# Patient Record
Sex: MALE | Race: BLACK OR AFRICAN AMERICAN | NOT HISPANIC OR LATINO | Employment: FULL TIME | URBAN - METROPOLITAN AREA
[De-identification: names, ages, dates, MRNs, and addresses within clinical notes are randomized per-mention and may not be internally consistent; named-entity substitution may affect disease eponyms.]

---

## 2023-09-01 ENCOUNTER — HOSPITAL ENCOUNTER (INPATIENT)
Facility: HOSPITAL | Age: 58
LOS: 4 days | Discharge: HOME | DRG: 638 | End: 2023-09-05
Attending: EMERGENCY MEDICINE | Admitting: ANESTHESIOLOGY
Payer: COMMERCIAL

## 2023-09-01 DIAGNOSIS — E11.10 DKA (DIABETIC KETOACIDOSIS) (HCC): Primary | ICD-10-CM

## 2023-09-01 DIAGNOSIS — E55.9 VITAMIN D DEFICIENCY: ICD-10-CM

## 2023-09-01 DIAGNOSIS — E86.0 DEHYDRATION: ICD-10-CM

## 2023-09-01 DIAGNOSIS — N17.9 ACUTE RENAL FAILURE (ARF) (HCC): ICD-10-CM

## 2023-09-01 DIAGNOSIS — E78.5 HYPERLIPIDEMIA: ICD-10-CM

## 2023-09-01 PROBLEM — E87.20 METABOLIC ACIDOSIS: Status: ACTIVE | Noted: 2023-09-01

## 2023-09-01 PROBLEM — D72.829 LEUKOCYTOSIS: Status: ACTIVE | Noted: 2023-09-01

## 2023-09-01 PROBLEM — R79.89 PSEUDOHYPONATREMIA: Status: ACTIVE | Noted: 2023-09-01

## 2023-09-01 LAB
ALBUMIN SERPL BCP-MCNC: 3.7 G/DL (ref 3.5–5)
ALBUMIN SERPL BCP-MCNC: 4.2 G/DL (ref 3.5–5)
ALP SERPL-CCNC: 118 U/L (ref 34–104)
ALT SERPL W P-5'-P-CCNC: 21 U/L (ref 7–52)
ANION GAP SERPL CALCULATED.3IONS-SCNC: 16 MMOL/L
ANION GAP SERPL CALCULATED.3IONS-SCNC: 21 MMOL/L
ANION GAP SERPL CALCULATED.3IONS-SCNC: 29 MMOL/L
AST SERPL W P-5'-P-CCNC: 32 U/L (ref 13–39)
BACTERIA UR QL AUTO: ABNORMAL /HPF
BASE EX.OXY STD BLDV CALC-SCNC: 73.8 % (ref 60–80)
BASE EX.OXY STD BLDV CALC-SCNC: 85.8 % (ref 60–80)
BASE EXCESS BLDV CALC-SCNC: -10 MMOL/L
BASE EXCESS BLDV CALC-SCNC: -3.8 MMOL/L
BASOPHILS # BLD AUTO: 0.02 THOUSANDS/ÂΜL (ref 0–0.1)
BASOPHILS NFR BLD AUTO: 0 % (ref 0–1)
BETA-HYDROXYBUTYRATE: 6.9 MMOL/L
BILIRUB SERPL-MCNC: 0.62 MG/DL (ref 0.2–1)
BILIRUB UR QL STRIP: NEGATIVE
BUN SERPL-MCNC: 68 MG/DL (ref 5–25)
BUN SERPL-MCNC: 71 MG/DL (ref 5–25)
BUN SERPL-MCNC: 79 MG/DL (ref 5–25)
CALCIUM SERPL-MCNC: 7.8 MG/DL (ref 8.4–10.2)
CALCIUM SERPL-MCNC: 8.2 MG/DL (ref 8.4–10.2)
CALCIUM SERPL-MCNC: 8.8 MG/DL (ref 8.4–10.2)
CHLORIDE SERPL-SCNC: 101 MMOL/L (ref 96–108)
CHLORIDE SERPL-SCNC: 105 MMOL/L (ref 96–108)
CHLORIDE SERPL-SCNC: 86 MMOL/L (ref 96–108)
CLARITY UR: CLEAR
CO2 SERPL-SCNC: 13 MMOL/L (ref 21–32)
CO2 SERPL-SCNC: 16 MMOL/L (ref 21–32)
CO2 SERPL-SCNC: 20 MMOL/L (ref 21–32)
COLOR UR: COLORLESS
CREAT SERPL-MCNC: 1.57 MG/DL (ref 0.6–1.3)
CREAT SERPL-MCNC: 1.68 MG/DL (ref 0.6–1.3)
CREAT SERPL-MCNC: 2.06 MG/DL (ref 0.6–1.3)
EOSINOPHIL # BLD AUTO: 0 THOUSAND/ÂΜL (ref 0–0.61)
EOSINOPHIL NFR BLD AUTO: 0 % (ref 0–6)
ERYTHROCYTE [DISTWIDTH] IN BLOOD BY AUTOMATED COUNT: 12 % (ref 11.6–15.1)
GFR SERPL CREATININE-BSD FRML MDRD: 34 ML/MIN/1.73SQ M
GFR SERPL CREATININE-BSD FRML MDRD: 43 ML/MIN/1.73SQ M
GFR SERPL CREATININE-BSD FRML MDRD: 47 ML/MIN/1.73SQ M
GLUCOSE SERPL-MCNC: 185 MG/DL (ref 65–140)
GLUCOSE SERPL-MCNC: 249 MG/DL (ref 65–140)
GLUCOSE SERPL-MCNC: 296 MG/DL (ref 65–140)
GLUCOSE SERPL-MCNC: 367 MG/DL (ref 65–140)
GLUCOSE SERPL-MCNC: 387 MG/DL (ref 65–140)
GLUCOSE SERPL-MCNC: 398 MG/DL (ref 65–140)
GLUCOSE SERPL-MCNC: 491 MG/DL (ref 65–140)
GLUCOSE SERPL-MCNC: 807 MG/DL (ref 65–140)
GLUCOSE SERPL-MCNC: >500 MG/DL (ref 65–140)
GLUCOSE UR STRIP-MCNC: ABNORMAL MG/DL
HCO3 BLDV-SCNC: 16.1 MMOL/L (ref 24–30)
HCO3 BLDV-SCNC: 20.8 MMOL/L (ref 24–30)
HCT VFR BLD AUTO: 51 % (ref 36.5–49.3)
HGB BLD-MCNC: 16 G/DL (ref 12–17)
HGB UR QL STRIP.AUTO: NEGATIVE
IMM GRANULOCYTES # BLD AUTO: 0.06 THOUSAND/UL (ref 0–0.2)
IMM GRANULOCYTES NFR BLD AUTO: 1 % (ref 0–2)
KETONES UR STRIP-MCNC: ABNORMAL MG/DL
LEUKOCYTE ESTERASE UR QL STRIP: ABNORMAL
LIPASE SERPL-CCNC: 94 U/L (ref 11–82)
LYMPHOCYTES # BLD AUTO: 2.84 THOUSANDS/ÂΜL (ref 0.6–4.47)
LYMPHOCYTES NFR BLD AUTO: 24 % (ref 14–44)
MAGNESIUM SERPL-MCNC: 3.3 MG/DL (ref 1.9–2.7)
MCH RBC QN AUTO: 27.2 PG (ref 26.8–34.3)
MCHC RBC AUTO-ENTMCNC: 31.4 G/DL (ref 31.4–37.4)
MCV RBC AUTO: 87 FL (ref 82–98)
MONOCYTES # BLD AUTO: 0.71 THOUSAND/ÂΜL (ref 0.17–1.22)
MONOCYTES NFR BLD AUTO: 6 % (ref 4–12)
MUCOUS THREADS UR QL AUTO: ABNORMAL
NEUTROPHILS # BLD AUTO: 8.01 THOUSANDS/ÂΜL (ref 1.85–7.62)
NEUTS SEG NFR BLD AUTO: 69 % (ref 43–75)
NITRITE UR QL STRIP: NEGATIVE
NON-SQ EPI CELLS URNS QL MICRO: ABNORMAL /HPF
NRBC BLD AUTO-RTO: 0 /100 WBCS
O2 CT BLDV-SCNC: 16.6 ML/DL
O2 CT BLDV-SCNC: 19.3 ML/DL
PCO2 BLDV: 36.6 MM HG (ref 42–50)
PCO2 BLDV: 36.9 MM HG (ref 42–50)
PH BLDV: 7.26 [PH] (ref 7.3–7.4)
PH BLDV: 7.37 [PH] (ref 7.3–7.4)
PH UR STRIP.AUTO: 5 [PH]
PHOSPHATE SERPL-MCNC: 2.7 MG/DL (ref 2.7–4.5)
PLATELET # BLD AUTO: 318 THOUSANDS/UL (ref 149–390)
PMV BLD AUTO: 12.3 FL (ref 8.9–12.7)
PO2 BLDV: 41.6 MM HG (ref 35–45)
PO2 BLDV: 61.5 MM HG (ref 35–45)
POTASSIUM SERPL-SCNC: 3.7 MMOL/L (ref 3.5–5.3)
POTASSIUM SERPL-SCNC: 3.8 MMOL/L (ref 3.5–5.3)
POTASSIUM SERPL-SCNC: 4.5 MMOL/L (ref 3.5–5.3)
PROCALCITONIN SERPL-MCNC: 0.21 NG/ML
PROT SERPL-MCNC: 8.3 G/DL (ref 6.4–8.4)
PROT UR STRIP-MCNC: NEGATIVE MG/DL
RBC # BLD AUTO: 5.89 MILLION/UL (ref 3.88–5.62)
RBC #/AREA URNS AUTO: ABNORMAL /HPF
SODIUM SERPL-SCNC: 128 MMOL/L (ref 135–147)
SODIUM SERPL-SCNC: 138 MMOL/L (ref 135–147)
SODIUM SERPL-SCNC: 141 MMOL/L (ref 135–147)
SP GR UR STRIP.AUTO: 1.02 (ref 1–1.03)
UROBILINOGEN UR STRIP-ACNC: <2 MG/DL
WBC # BLD AUTO: 11.64 THOUSAND/UL (ref 4.31–10.16)
WBC #/AREA URNS AUTO: ABNORMAL /HPF

## 2023-09-01 PROCEDURE — 80053 COMPREHEN METABOLIC PANEL: CPT | Performed by: EMERGENCY MEDICINE

## 2023-09-01 PROCEDURE — 96361 HYDRATE IV INFUSION ADD-ON: CPT

## 2023-09-01 PROCEDURE — 84145 PROCALCITONIN (PCT): CPT | Performed by: NURSE PRACTITIONER

## 2023-09-01 PROCEDURE — 85025 COMPLETE CBC W/AUTO DIFF WBC: CPT | Performed by: EMERGENCY MEDICINE

## 2023-09-01 PROCEDURE — 84681 ASSAY OF C-PEPTIDE: CPT | Performed by: PHYSICIAN ASSISTANT

## 2023-09-01 PROCEDURE — 82805 BLOOD GASES W/O2 SATURATION: CPT | Performed by: EMERGENCY MEDICINE

## 2023-09-01 PROCEDURE — 99284 EMERGENCY DEPT VISIT MOD MDM: CPT

## 2023-09-01 PROCEDURE — 80048 BASIC METABOLIC PNL TOTAL CA: CPT | Performed by: ANESTHESIOLOGY

## 2023-09-01 PROCEDURE — 86337 INSULIN ANTIBODIES: CPT | Performed by: PHYSICIAN ASSISTANT

## 2023-09-01 PROCEDURE — 83036 HEMOGLOBIN GLYCOSYLATED A1C: CPT | Performed by: NURSE PRACTITIONER

## 2023-09-01 PROCEDURE — 83519 RIA NONANTIBODY: CPT | Performed by: PHYSICIAN ASSISTANT

## 2023-09-01 PROCEDURE — 82948 REAGENT STRIP/BLOOD GLUCOSE: CPT

## 2023-09-01 PROCEDURE — 99291 CRITICAL CARE FIRST HOUR: CPT | Performed by: NURSE PRACTITIONER

## 2023-09-01 PROCEDURE — 86341 ISLET CELL ANTIBODY: CPT | Performed by: PHYSICIAN ASSISTANT

## 2023-09-01 PROCEDURE — 83690 ASSAY OF LIPASE: CPT | Performed by: EMERGENCY MEDICINE

## 2023-09-01 PROCEDURE — 99291 CRITICAL CARE FIRST HOUR: CPT | Performed by: EMERGENCY MEDICINE

## 2023-09-01 PROCEDURE — 36415 COLL VENOUS BLD VENIPUNCTURE: CPT

## 2023-09-01 PROCEDURE — 82805 BLOOD GASES W/O2 SATURATION: CPT | Performed by: NURSE PRACTITIONER

## 2023-09-01 PROCEDURE — 82010 KETONE BODYS QUAN: CPT | Performed by: EMERGENCY MEDICINE

## 2023-09-01 PROCEDURE — 81001 URINALYSIS AUTO W/SCOPE: CPT | Performed by: EMERGENCY MEDICINE

## 2023-09-01 PROCEDURE — 80069 RENAL FUNCTION PANEL: CPT | Performed by: PHYSICIAN ASSISTANT

## 2023-09-01 PROCEDURE — 83735 ASSAY OF MAGNESIUM: CPT | Performed by: PHYSICIAN ASSISTANT

## 2023-09-01 PROCEDURE — 96365 THER/PROPH/DIAG IV INF INIT: CPT

## 2023-09-01 PROCEDURE — 96376 TX/PRO/DX INJ SAME DRUG ADON: CPT

## 2023-09-01 RX ORDER — SODIUM CHLORIDE, SODIUM LACTATE, POTASSIUM CHLORIDE, CALCIUM CHLORIDE 600; 310; 30; 20 MG/100ML; MG/100ML; MG/100ML; MG/100ML
500 INJECTION, SOLUTION INTRAVENOUS CONTINUOUS
Status: DISPENSED | OUTPATIENT
Start: 2023-09-01 | End: 2023-09-01

## 2023-09-01 RX ORDER — SODIUM CHLORIDE, SODIUM LACTATE, POTASSIUM CHLORIDE, CALCIUM CHLORIDE 600; 310; 30; 20 MG/100ML; MG/100ML; MG/100ML; MG/100ML
125 INJECTION, SOLUTION INTRAVENOUS CONTINUOUS
Status: DISCONTINUED | OUTPATIENT
Start: 2023-09-01 | End: 2023-09-02

## 2023-09-01 RX ORDER — ONDANSETRON 2 MG/ML
4 INJECTION INTRAMUSCULAR; INTRAVENOUS EVERY 6 HOURS PRN
Status: DISCONTINUED | OUTPATIENT
Start: 2023-09-01 | End: 2023-09-05 | Stop reason: HOSPADM

## 2023-09-01 RX ORDER — SODIUM CHLORIDE 9 MG/ML
3 INJECTION INTRAVENOUS
Status: DISCONTINUED | OUTPATIENT
Start: 2023-09-01 | End: 2023-09-05 | Stop reason: HOSPADM

## 2023-09-01 RX ORDER — ENOXAPARIN SODIUM 100 MG/ML
40 INJECTION SUBCUTANEOUS DAILY
Status: DISCONTINUED | OUTPATIENT
Start: 2023-09-02 | End: 2023-09-05 | Stop reason: HOSPADM

## 2023-09-01 RX ORDER — ACETAMINOPHEN 325 MG/1
650 TABLET ORAL EVERY 6 HOURS PRN
Status: DISCONTINUED | OUTPATIENT
Start: 2023-09-01 | End: 2023-09-05 | Stop reason: HOSPADM

## 2023-09-01 RX ORDER — CHLORHEXIDINE GLUCONATE ORAL RINSE 1.2 MG/ML
15 SOLUTION DENTAL EVERY 12 HOURS SCHEDULED
Status: DISCONTINUED | OUTPATIENT
Start: 2023-09-01 | End: 2023-09-05 | Stop reason: HOSPADM

## 2023-09-01 RX ORDER — POTASSIUM CHLORIDE 20 MEQ/1
20 TABLET, EXTENDED RELEASE ORAL ONCE
Status: COMPLETED | OUTPATIENT
Start: 2023-09-01 | End: 2023-09-01

## 2023-09-01 RX ORDER — DEXTROSE, SODIUM CHLORIDE, SODIUM LACTATE, POTASSIUM CHLORIDE, AND CALCIUM CHLORIDE 5; .6; .31; .03; .02 G/100ML; G/100ML; G/100ML; G/100ML; G/100ML
125 INJECTION, SOLUTION INTRAVENOUS CONTINUOUS
Status: DISCONTINUED | OUTPATIENT
Start: 2023-09-01 | End: 2023-09-02

## 2023-09-01 RX ORDER — SODIUM CHLORIDE, SODIUM LACTATE, POTASSIUM CHLORIDE, CALCIUM CHLORIDE 600; 310; 30; 20 MG/100ML; MG/100ML; MG/100ML; MG/100ML
125 INJECTION, SOLUTION INTRAVENOUS CONTINUOUS
Status: DISCONTINUED | OUTPATIENT
Start: 2023-09-01 | End: 2023-09-01

## 2023-09-01 RX ADMIN — SODIUM CHLORIDE, SODIUM LACTATE, POTASSIUM CHLORIDE, AND CALCIUM CHLORIDE 500 ML/HR: .6; .31; .03; .02 INJECTION, SOLUTION INTRAVENOUS at 21:34

## 2023-09-01 RX ADMIN — SODIUM CHLORIDE, SODIUM LACTATE, POTASSIUM CHLORIDE, AND CALCIUM CHLORIDE 250 ML/HR: .6; .31; .03; .02 INJECTION, SOLUTION INTRAVENOUS at 23:33

## 2023-09-01 RX ADMIN — DEXTROSE, SODIUM CHLORIDE, SODIUM LACTATE, POTASSIUM CHLORIDE, AND CALCIUM CHLORIDE 250 ML/HR: 5; .6; .31; .03; .02 INJECTION, SOLUTION INTRAVENOUS at 22:02

## 2023-09-01 RX ADMIN — SODIUM CHLORIDE, SODIUM LACTATE, POTASSIUM CHLORIDE, AND CALCIUM CHLORIDE 500 ML/HR: .6; .31; .03; .02 INJECTION, SOLUTION INTRAVENOUS at 19:40

## 2023-09-01 RX ADMIN — SODIUM CHLORIDE 7.3 UNITS/HR: 9 INJECTION, SOLUTION INTRAVENOUS at 17:02

## 2023-09-01 RX ADMIN — INSULIN HUMAN 10 UNITS: 100 INJECTION, SOLUTION PARENTERAL at 16:51

## 2023-09-01 RX ADMIN — SODIUM CHLORIDE 2000 ML: 0.9 INJECTION, SOLUTION INTRAVENOUS at 16:55

## 2023-09-01 RX ADMIN — SODIUM CHLORIDE 1000 ML: 0.9 INJECTION, SOLUTION INTRAVENOUS at 15:00

## 2023-09-01 RX ADMIN — POTASSIUM CHLORIDE 20 MEQ: 1500 TABLET, EXTENDED RELEASE ORAL at 21:57

## 2023-09-01 NOTE — H&P
[address]  H&P  Name: Nona Gates 60 y.o. male I MRN: [id number]  Unit/Bed#: ED 24 I Date of Admission: 9/1/2023   Date of Service: 9/1/2023 I Hospital Day: 0      Assessment/Plan   Metabolic acidosis  Assessment & Plan  · In the setting of DKA  · Will likely correct quickly with IV hydration  · Trend labs    JUAN (acute kidney injury) ([address])  Assessment & Plan  · Baseline creatinine unknown  · Creatinine on admission 2.06  · May be pre-renal in the setting of DKA  · Will continue with IV hydration and monitor  · Avoid nephrotoxic agents  · Strict I/Os  · Trend renal indices    Pseudohyponatremia  Assessment & Plan  · In the setting of hyperglycemia  · Corrected sodium 139    Leukocytosis  Assessment & Plan  · WBC slightly elevated  · Likely reactive in the setting of DKA  · Follow WBC and fever curve  · Will check procal  · Observe off antibiotics    * DKA (diabetic ketoacidosis) ([address])  Assessment & Plan  No results found for: "HGBA1C"    Recent Labs     09/01/23  1433 09/01/23  1541 09/01/23  1813   POCGLU >500* >500* >500*     · Pt without previously known history of DM, reported he was told by his physician his glucose was high on lab work  · Patient also reports nausea and poor appetite  · In ED was found to be in DKA with glucose of 807, elevated hydroxybuterate   · Given insulin bolus and DKA drip started  · Will continue with IV hydration  · Keep NPO for now  · Will consult endocrine for recommendations once DKA resolved             History of Present Illness     HPI: Nona Gates is a 60 y.o. who presents with nausea, poor appetite, and abnormal lab work. The patient reports his physician told him to come to the ED because his glucose was high. He does not have prior history of diabetes or DKA. He was noted to have a glucose of 807, and elevated Beta hydroxybutyrate and acidosis on lab work in the ED. He is being admitted to [address] [place] [address] for management of DKA.     History obtained from chart review and the patient. Review of Systems   Gastrointestinal: Positive for nausea. Historical Information   No past medical history on file. No past surgical history on file. No current outpatient medications Allergies   Allergen Reactions   • Penicillins Edema      Social History     Tobacco Use   • Smoking status: Never   • Smokeless tobacco: Never   Vaping Use   • Vaping Use: Never used   Substance Use Topics   • Alcohol use: Not Currently    History reviewed. No pertinent family history. Objective                            Vitals I/O      Most Recent Min/Max in 24hrs   Temp 97.6 °F (36.4 °C) Temp  Min: 97.6 °F (36.4 °C)  Max: 97.6 °F (36.4 °C)   Pulse 65 Pulse  Min: 65  Max: 76   Resp 15 Resp  Min: 15  Max: 18   /78 BP  Min: 143/82  Max: 154/93   O2 Sat 99 % SpO2  Min: 99 %  Max: 100 %      Intake/Output Summary (Last 24 hours) at 9/1/2023 1912  Last data filed at 9/1/2023 1831  Gross per 24 hour   Intake 1000 ml   Output 1750 ml   Net -750 ml         Diet NPO     Invasive Monitoring Physical exam    Physical Exam  Eyes:      Extraocular Movements: Extraocular movements intact. Pupils: Pupils are equal, round, and reactive to light. Skin:     General: Skin is dry. HENT:      Head: Normocephalic and atraumatic. Mouth/Throat:      Mouth: Mucous membranes are dry. Neck:     Vascular: No JVD. Cardiovascular:      Rate and Rhythm: Normal rate and regular rhythm. Pulses: Normal pulses. Heart sounds: Normal heart sounds. Abdominal:      General: Bowel sounds are normal.      Palpations: Abdomen is soft. Constitutional:       General: He is not in acute distress. Appearance: He is well-developed. Pulmonary:      Effort: Pulmonary effort is normal.      Breath sounds: Normal breath sounds. Neurological:      General: No focal deficit present. Mental Status: He is alert and oriented to person, place and time.             Diagnostic Studies EKG: NSR       Medications:  Scheduled PRN   chlorhexidine, 15 mL, Q12H [address]  [START ON 9/2/2023] enoxaparin, 40 mg, Daily      sodium chloride (PF), 3 mL, Q1H PRN       Continuous    dextrose 5% lactated ringer's, 250 mL/hr  insulin regular (HumuLIN R,NovoLIN R) 1 Units/mL in sodium chloride 0.9 % 100 mL infusion, 0.1-30 Units/hr, Last Rate: 7.3 Units/hr (09/01/23 1840)  lactated ringers, 500 mL/hr   Followed by  lactated ringers, 250 mL/hr  lactated ringers, 125 mL/hr         Labs:    CBC    Recent Labs     09/01/23  1444   WBC 11.64*   HGB 16.0   HCT 51.0*        BMP    Recent Labs     09/01/23  1444   SODIUM 128*   K 4.5   CL 86*   CO2 13*   AGAP 29   BUN 79*   CREATININE 2.06*   CALCIUM 8.8       Coags    No recent results     Additional Electrolytes  No recent results       Blood Gas    No recent results  Recent Labs     09/01/23  1547   PHVEN 7.262*   TAE0KMP 36.6*   PO2VEN 61.5*   [id number] 16.1*   BEVEN -10.0    LFTs  Recent Labs     09/01/23  1444   ALT 21   AST 32   ALKPHOS 118*   ALB 4.2   TBILI 0.62       Infectious  No recent results  Glucose  Recent Labs     09/01/23  1444   GLUC 807*             Critical Care Time Delivered: Upon my evaluation, this patient had a high probability of imminent or life-threatening deterioration due to DKA, which required my direct attention, intervention, and personal management. I have personally provided 33 minutes of critical care time, exclusive of procedures, teaching, family meetings, and any prior time recorded by providers other than myself.    Anticipated Length of Stay is > 2 midnights  BINA Michaels

## 2023-09-01 NOTE — ED PROVIDER NOTES
Pt Name: Richa Venegas  MRN: [id number]  [address] 1965  Age/Sex: 60 y.o. male  Date of evaluation: 9/1/2023  PCP: No primary care provider on file. CHIEF COMPLAINT    Chief Complaint   Patient presents with   • Abnormal Lab     Pt reports that he went to the doc and they told him his BS is high , pt reports that he has nausea and not a good appetite          HPI    60 y.o. male presenting with elevated blood sugar and fatigue as well as polyuria and polydipsia. Patient states that he has been drinking and urinating a lot over the past few days to weeks, states that he went to a doctor a few days ago and was told his blood sugar was high, states that since then has been feeling worse. He also complains of nausea, fatigue, as well as poor appetite but denies fever, trauma, chest pain, shortness breath, abdominal pain, numbness, weakness, other symptoms. Patient denies previous history of diabetes. HPI      Past Medical and Surgical History    History reviewed. No pertinent past medical history. History reviewed. No pertinent surgical history. Family History   Problem Relation Age of Onset   • Diabetes Father    • Diabetes Maternal Uncle        Social History     Tobacco Use   • Smoking status: Never   • Smokeless tobacco: Never   Vaping Use   • Vaping Use: Never used   Substance Use Topics   • Alcohol use: Not Currently   • Drug use: Never           Allergies    Allergies   Allergen Reactions   • Penicillins Edema       Home Medications    Prior to Admission medications    Not on File           Review of Systems    Review of Systems   Constitutional: Positive for fatigue. Negative for appetite change, chills and diaphoresis. HENT: Negative for drooling, facial swelling, trouble swallowing and voice change. Respiratory: Negative for apnea, shortness of breath and wheezing. Cardiovascular: Negative for chest pain and leg swelling. Gastrointestinal: Positive for nausea.  Negative for abdominal distention, abdominal pain, diarrhea and vomiting. Endocrine: Positive for polydipsia and polyuria. Genitourinary: Negative for dysuria and urgency. Musculoskeletal: Negative for arthralgias, back pain, gait problem and neck pain. Skin: Negative for color change, rash and wound. Neurological: Negative for seizures, speech difficulty, weakness and headaches. Psychiatric/Behavioral: Negative for agitation, behavioral problems and dysphoric mood. The patient is not nervous/anxious. All other systems reviewed and negative. Physical Exam      ED Triage Vitals   Temperature Pulse Respirations Blood Pressure SpO2   09/01/23 1417 09/01/23 1417 09/01/23 1417 09/01/23 1417 09/01/23 1417   97.6 °F (36.4 °C) 75 18 154/93 100 %      Temp src Heart Rate Source Patient Position - Orthostatic VS BP Location FiO2 (%)   -- 09/01/23 1417 09/01/23 1711 09/01/23 1711 --    Monitor Lying Left arm       Pain Score       --                      Physical Exam  Vitals and nursing note reviewed. Constitutional:       General: He is in acute distress. Appearance: He is well-developed. He is ill-appearing. He is not toxic-appearing or diaphoretic. HENT:      Head: Normocephalic and atraumatic. Right Ear: External ear normal.      Left Ear: External ear normal.      Nose: Nose normal. No congestion or rhinorrhea. Mouth/Throat:      Mouth: Mucous membranes are dry. Pharynx: Oropharynx is clear. Eyes:      Conjunctiva/sclera: Conjunctivae normal.      Pupils: Pupils are equal, round, and reactive to light. Neck:      Trachea: No tracheal deviation. Cardiovascular:      Rate and Rhythm: Normal rate and regular rhythm. Pulses: Normal pulses. Heart sounds: Normal heart sounds. No murmur heard. Pulmonary:      Effort: Pulmonary effort is normal. No respiratory distress. Breath sounds: Normal breath sounds. No stridor. No wheezing or rales.    Abdominal:      General: There is no distension. Palpations: Abdomen is soft. Tenderness: There is no abdominal tenderness. There is no guarding or rebound. Musculoskeletal:         General: No deformity. Normal range of motion. Cervical back: Normal range of motion and neck supple. No tenderness. Right lower leg: No edema. Left lower leg: No edema. Skin:     General: Skin is warm and dry. Capillary Refill: Capillary refill takes less than 2 seconds. Findings: No rash. Neurological:      Mental Status: He is alert and oriented to person, place, and time. Cranial Nerves: No cranial nerve deficit. Motor: No weakness. Coordination: Coordination normal.      Gait: Gait normal.   Psychiatric:         Behavior: Behavior normal.         Thought Content:  Thought content normal.         Judgment: Judgment normal.              Diagnostic Results      Labs:    Results Reviewed     Procedure Component Value Units Date/Time    Procalcitonin [[id number]]  (Normal) Collected: 09/01/23 2011    Lab Status: Final result Specimen: Blood from Hand, Left Updated: 09/01/23 2052     Procalcitonin 0.21 ng/ml     Renal function panel [[id number]]  (Abnormal) Collected: 09/01/23 2011    Lab Status: Final result Specimen: Blood from Hand, Left Updated: 09/01/23 2046     Albumin 3.7 g/dL      Calcium 8.2 mg/dL      Phosphorus 2.7 mg/dL      Glucose 387 mg/dL      BUN 68 mg/dL      Creatinine 1.57 mg/dL      Sodium 141 mmol/L      Potassium 3.7 mmol/L      Chloride 105 mmol/L      CO2 20 mmol/L      ANION GAP 16 mmol/L      eGFR 47 ml/min/1.73sq m     Narrative:      [place] guidelines for Chronic Kidney Disease (CKD):   •  Stage 1 with normal or high GFR (GFR > 90 mL/min/1.73 square meters)  •  Stage 2 Mild CKD (GFR = 60-89 mL/min/1.73 square meters)  •  Stage 3A Moderate CKD (GFR = 45-59 mL/min/1.73 square meters)  •  Stage 3B Moderate CKD (GFR = 30-44 mL/min/1.73 square meters)  • Stage 4 Severe CKD (GFR = 15-29 mL/min/1.73 square meters)  •  Stage 5 End Stage CKD (GFR <15 mL/min/1.73 square meters)  Note: GFR calculation is accurate only with a steady state creatinine    Magnesium [[id number]]  (Abnormal) Collected: 09/01/23 2011    Lab Status: Final result Specimen: Blood from Hand, Left Updated: 09/01/23 2045     Magnesium 3.3 mg/dL     Blood gas, venous [[id number]]  (Abnormal) Collected: 09/01/23 2011    Lab Status: Final result Specimen: Blood from Hand, Left Updated: 09/01/23 2029     pH, Delgado 7.369     pCO2, Delgado 36.9 mm Hg      pO2, Delgado 41.6 mm Hg      HCO3, Delgado 20.8 mmol/L      Base Excess, Delgado -3.8 mmol/L      O2 Content, Delgado 16.6 ml/dL      O2 HGB, VENOUS 73.8 %     C-peptide [[id number]] Collected: 09/01/23 2011    Lab Status: In process Specimen: Blood from Hand, Left Updated: 09/01/23 2019    Hemoglobin A1C [[id number]] Collected: 09/01/23 2011    Lab Status: In process Specimen: Blood from Hand, Left Updated: 09/01/23 2019    Insulin antibody [[id number]] Collected: 09/01/23 2011    Lab Status: In process Specimen: Blood from Hand, Left Updated: 09/01/23 2019    Anti-islet cell antibody [[id number]] Collected: 09/01/23 2011    Lab Status: In process Specimen: Blood from Hand, Left Updated: 09/01/23 2018    Glutamic acid decarboxylase [[id number]] Collected: 09/01/23 2011    Lab Status:  In process Specimen: Blood from Hand, Left Updated: 09/01/23 2018    Renal function panel [[id number]]     Lab Status: No result Specimen: Blood     Magnesium [[id number]]     Lab Status: No result Specimen: Blood     Blood gas, venous [[id number]]     Lab Status: No result Specimen: Blood     Basic metabolic panel [[id number]]  (Abnormal) Collected: 09/01/23 1859    Lab Status: Final result Specimen: Blood from Hand, Right Updated: 09/01/23 1948     Sodium 138 mmol/L      Potassium 3.8 mmol/L      Chloride 101 mmol/L      CO2 16 mmol/L      ANION GAP 21 mmol/L      BUN 71 mg/dL      Creatinine 1.68 mg/dL Glucose 491 mg/dL      Calcium 7.8 mg/dL      eGFR 43 ml/min/1.73sq m     Narrative:      [place] guidelines for Chronic Kidney Disease (CKD):   •  Stage 1 with normal or high GFR (GFR > 90 mL/min/1.73 square meters)  •  Stage 2 Mild CKD (GFR = 60-89 mL/min/1.73 square meters)  •  Stage 3A Moderate CKD (GFR = 45-59 mL/min/1.73 square meters)  •  Stage 3B Moderate CKD (GFR = 30-44 mL/min/1.73 square meters)  •  Stage 4 Severe CKD (GFR = 15-29 mL/min/1.73 square meters)  •  Stage 5 End Stage CKD (GFR <15 mL/min/1.73 square meters)  Note: GFR calculation is accurate only with a steady state creatinine    Fingerstick Glucose (POCT) [[id number]]  (Abnormal) Collected: 09/01/23 1905    Lab Status: Final result Updated: 09/01/23 1908     POC Glucose 398 mg/dl     Urine Microscopic [[id number]]  (Abnormal) Collected: 09/01/23 1831    Lab Status: Final result Specimen: Urine, Clean Catch Updated: 09/01/23 1853     RBC, UA None Seen /hpf      WBC, UA None Seen /hpf      Epithelial Cells None Seen /hpf      Bacteria, UA Occasional /hpf      MUCUS THREADS Occasional    UA w Reflex to Microscopic w Reflex to Culture [[id number]]  (Abnormal) Collected: 09/01/23 1831    Lab Status: Final result Specimen: Urine, Clean Catch Updated: 09/01/23 1850     Color, UA Colorless     Clarity, UA Clear     Specific Gravity, UA 1.023     pH, UA 5.0     Leukocytes, UA Elevated glucose may cause decreased leukocyte values.  See urine microscopic for UWBC result     Nitrite, UA Negative     Protein, UA Negative mg/dl      Glucose, UA >=1000 (1%) mg/dl      Ketones, UA 40 (2+) mg/dl      Urobilinogen, UA <2.0 mg/dl      Bilirubin, UA Negative     Occult Blood, UA Negative    Fingerstick Glucose (POCT) [[id number]]  (Abnormal) Collected: 09/01/23 1813    Lab Status: Final result Updated: 09/01/23 1815     POC Glucose >500 mg/dl     Fingerstick Glucose (POCT) [[id number]]  (Abnormal) Collected: 09/01/23 1541    Lab Status: Final result Updated: 09/01/23 1815     POC Glucose >500 mg/dl     Lipase [[id number]]  (Abnormal) Collected: 09/01/23 1444    Lab Status: Final result Specimen: Blood from Arm, Right Updated: 09/01/23 1809     Lipase 94 u/L     Beta Hydroxybutyrate [[id number]]  (Abnormal) Collected: 09/01/23 1547    Lab Status: Final result Specimen: Blood from Arm, Right Updated: 09/01/23 1615     BETA-HYDROXYBUTYRATE 6.9 mmol/L     Blood gas, venous [[id number]]  (Abnormal) Collected: 09/01/23 1547    Lab Status: Final result Specimen: Blood from Arm, Right Updated: 09/01/23 1605     pH, Delgado 7.262     pCO2, Delgado 36.6 mm Hg      pO2, Delgado 61.5 mm Hg      HCO3, Delgado 16.1 mmol/L      Base Excess, Delgado -10.0 mmol/L      O2 Content, Delgado 19.3 ml/dL      O2 HGB, VENOUS 85.8 %     Comprehensive metabolic panel [[id number]]  (Abnormal) Collected: 09/01/23 1444    Lab Status: Final result Specimen: Blood from Arm, Right Updated: 09/01/23 1538     Sodium 128 mmol/L      Potassium 4.5 mmol/L      Chloride 86 mmol/L      CO2 13 mmol/L      ANION GAP 29 mmol/L      BUN 79 mg/dL      Creatinine 2.06 mg/dL      Glucose 807 mg/dL      Calcium 8.8 mg/dL      AST 32 U/L      ALT 21 U/L      Alkaline Phosphatase 118 U/L      Total Protein 8.3 g/dL      Albumin 4.2 g/dL      Total Bilirubin 0.62 mg/dL      eGFR 34 ml/min/1.73sq m     Narrative:      [place] guidelines for Chronic Kidney Disease (CKD):   •  Stage 1 with normal or high GFR (GFR > 90 mL/min/1.73 square meters)  •  Stage 2 Mild CKD (GFR = 60-89 mL/min/1.73 square meters)  •  Stage 3A Moderate CKD (GFR = 45-59 mL/min/1.73 square meters)  •  Stage 3B Moderate CKD (GFR = 30-44 mL/min/1.73 square meters)  •  Stage 4 Severe CKD (GFR = 15-29 mL/min/1.73 square meters)  •  Stage 5 End Stage CKD (GFR <15 mL/min/1.73 square meters)  Note: GFR calculation is accurate only with a steady state creatinine    CBC and differential [[id number]]  (Abnormal) Collected: 09/01/23 8875 Lab Status: Final result Specimen: Blood from Arm, Right Updated: 09/01/23 1455     WBC 11.64 Thousand/uL      RBC 5.89 Million/uL      Hemoglobin 16.0 g/dL      Hematocrit 51.0 %      MCV 87 fL      MCH 27.2 pg      MCHC 31.4 g/dL      RDW 12.0 %      MPV 12.3 fL      Platelets 185 Thousands/uL      nRBC 0 /100 WBCs      Neutrophils Relative 69 %      Immat GRANS % 1 %      Lymphocytes Relative 24 %      Monocytes Relative 6 %      Eosinophils Relative 0 %      Basophils Relative 0 %      Neutrophils Absolute 8.01 Thousands/µL      Immature Grans Absolute 0.06 Thousand/uL      Lymphocytes Absolute 2.84 Thousands/µL      Monocytes Absolute 0.71 Thousand/µL      Eosinophils Absolute 0.00 Thousand/µL      Basophils Absolute 0.02 Thousands/µL     Fingerstick Glucose (POCT) [[id number]]  (Abnormal) Collected: 09/01/23 1433    Lab Status: Final result Updated: 09/01/23 1435     POC Glucose >500 mg/dl           All labs reviewed and utilized in the medical decision making process    Radiology:    No orders to display       All radiology studies independently viewed by me and interpreted by the radiologist.    Procedure    CriticalCare Time    Date/Time: 9/1/2023 8:57 PM    Performed by: Frederick Danielle MD  Authorized by: Frederick Danielle MD    Critical care provider statement:     Critical care time (minutes):  45    Critical care time was exclusive of:  Separately billable procedures and treating other patients and teaching time    Critical care was necessary to treat or prevent imminent or life-threatening deterioration of the following conditions:  Renal failure and endocrine crisis    Critical care was time spent personally by me on the following activities:  Obtaining history from patient or surrogate, development of treatment plan with patient or surrogate, discussions with consultants, evaluation of patient's response to treatment, examination of patient, ordering and performing treatments and interventions, ordering and review of laboratory studies and re-evaluation of patient's condition            ED Course of Care and Re-Assessments      On arrival, patient ill-appearing, fluids started empirically while labs were obtained, point-of-care glucose greater than 500. History and examination concerning for new onset diabetes as well as hyperosmolar state versus diabetic ketoacidosis. After empiric fluids, labs resulted, patient was found to be in diabetic ketoacidosis. Given insulin bolus and insulin drip initiated. Discussed with Dr. Yazan Ruiz of critical care and patient admitted to his service. Medications   sodium chloride (PF) 0.9 % injection 3 mL (has no administration in time range)   insulin regular (HumuLIN R,NovoLIN R) 1 Units/mL in sodium chloride 0.9 % 100 mL infusion (14.6 Units/hr Intravenous Rate/Dose Change 9/1/23 2026)   chlorhexidine (PERIDEX) 0.12 % oral rinse 15 mL (15 mL Mouth/Throat Not Given 9/1/23 2039)   dextrose 5 % in lactated Ringer's infusion (has no administration in time range)   lactated ringers infusion (500 mL/hr Intravenous New Bag 9/1/23 1940)     Followed by   lactated ringers infusion (has no administration in time range)   lactated ringers infusion (has no administration in time range)   enoxaparin (LOVENOX) subcutaneous injection 40 mg (has no administration in time range)   sodium chloride 0.9 % bolus 1,000 mL (0 mL Intravenous Stopped 9/1/23 1600)   sodium chloride 0.9 % bolus 2,000 mL (2,000 mL Intravenous New Bag 9/1/23 1655)   insulin regular (HumuLIN R,NovoLIN R) injection 10 Units (10 Units Intravenous Given 9/1/23 1651)           FINAL IMPRESSION    Final diagnoses:   DKA (diabetic ketoacidosis) ([address])   Acute renal failure (ARF) ([address])   Dehydration         DISPOSITION/PLAN    Presentation as above felt most consistent with acute renal failure in the setting of diabetic ketoacidosis and dehydration.   Suspect dehydration and renal failure secondary to volume losses from hyperglycemia and polyuria not compensated for by intake. No infectious symptoms or signs of infection on initial emergency department work-up. Patient did note on subsequent interview that he had been drinking a large amount of sugary drinks, is unclear if this destabilized the system or if it is secondary to longstanding untreated diabetes with decompensation. After initial treatment resuscitation emergency department, admitted to critical care, hemodynamically stable and comfortable at time of admit. Time reflects when diagnosis was documented in both MDM as applicable and the Disposition within this note     Time User Action Codes Description Comment    9/1/2023  5:24 PM Shannan CAMARA Add [E11.10] DKA (diabetic ketoacidosis) ([address])     9/1/2023  5:24 PM Ulysses Dole Add [N17.9] Acute renal failure (ARF) ([address])     9/1/2023  5:24 PM Ulysses Dole Add [E86.0] Dehydration       ED Disposition     ED Disposition   Admit    Condition   Stable    Date/Time   Fri Sep 1, 2023  5:24 PM    Comment   Case was discussed with Critical Care and the patient's admission status was agreed to be Admission Status: inpatient status to the service of Dr. Gustabo Cranker . Follow-up Information    None           PATIENT REFERRED TO:    No follow-up provider specified. DISCHARGE MEDICATIONS:    There are no discharge medications for this patient. No discharge procedures on file. Ulysses Dole, MD    Portions of the record may have been created with voice recognition software. Occasional wrong word or "sound alike" substitutions may have occurred due to the inherent limitations of voice recognition software.   Please read the chart carefully and recognize, using context, where substitutions have occurred     Ulysses Dole, MD  09/01/23 2057

## 2023-09-01 NOTE — ASSESSMENT & PLAN NOTE
No results found for: "HGBA1C"    Recent Labs     09/01/23  1433 09/01/23  1541 09/01/23  1813   POCGLU >500* >500* >500*     · Pt without previously known history of DM, reported he was told by his physician his glucose was high on lab work  · Patient also reports nausea and poor appetite  · In ED was found to be in DKA with glucose of 807, elevated hydroxybuterate   · Given insulin bolus and DKA drip started  · Will continue with IV hydration  · Keep NPO for now  · Will consult endocrine for recommendations once DKA resolved

## 2023-09-01 NOTE — ASSESSMENT & PLAN NOTE
· Baseline creatinine unknown  · Creatinine on admission 2.06  · May be pre-renal in the setting of DKA  · Will continue with IV hydration and monitor  · Avoid nephrotoxic agents  · Strict I/Os  · Trend renal indices

## 2023-09-01 NOTE — ASSESSMENT & PLAN NOTE
· WBC slightly elevated  · Likely reactive in the setting of DKA  · Follow WBC and fever curve  · Will check procal  · Observe off antibiotics

## 2023-09-02 PROBLEM — E87.20 METABOLIC ACIDOSIS: Status: RESOLVED | Noted: 2023-09-01 | Resolved: 2023-09-02

## 2023-09-02 PROBLEM — R79.89 PSEUDOHYPONATREMIA: Status: RESOLVED | Noted: 2023-09-01 | Resolved: 2023-09-02

## 2023-09-02 LAB
ALBUMIN SERPL BCP-MCNC: 2.8 G/DL (ref 3.5–5)
ALBUMIN SERPL BCP-MCNC: 2.9 G/DL (ref 3.5–5)
ALBUMIN SERPL BCP-MCNC: 3 G/DL (ref 3.5–5)
ALP SERPL-CCNC: 67 U/L (ref 34–104)
ALT SERPL W P-5'-P-CCNC: 15 U/L (ref 7–52)
ANION GAP SERPL CALCULATED.3IONS-SCNC: 4 MMOL/L
ANION GAP SERPL CALCULATED.3IONS-SCNC: 5 MMOL/L
ANION GAP SERPL CALCULATED.3IONS-SCNC: 7 MMOL/L
ARTERIAL PATENCY WRIST A: YES
ARTERIAL PATENCY WRIST A: YES
AST SERPL W P-5'-P-CCNC: 21 U/L (ref 13–39)
BASE EX.OXY STD BLDV CALC-SCNC: 63.8 % (ref 60–80)
BASE EX.OXY STD BLDV CALC-SCNC: 96.6 % (ref 60–80)
BASE EXCESS BLDV CALC-SCNC: 1.3 MMOL/L
BASE EXCESS BLDV CALC-SCNC: 4.7 MMOL/L
BASOPHILS # BLD AUTO: 0.01 THOUSANDS/ÂΜL (ref 0–0.1)
BASOPHILS NFR BLD AUTO: 0 % (ref 0–1)
BILIRUB SERPL-MCNC: 0.39 MG/DL (ref 0.2–1)
BUN SERPL-MCNC: 42 MG/DL (ref 5–25)
BUN SERPL-MCNC: 42 MG/DL (ref 5–25)
BUN SERPL-MCNC: 51 MG/DL (ref 5–25)
CALCIUM ALBUM COR SERPL-MCNC: 8.2 MG/DL (ref 8.3–10.1)
CALCIUM ALBUM COR SERPL-MCNC: 8.2 MG/DL (ref 8.3–10.1)
CALCIUM ALBUM COR SERPL-MCNC: 8.6 MG/DL (ref 8.3–10.1)
CALCIUM SERPL-MCNC: 7.2 MG/DL (ref 8.4–10.2)
CALCIUM SERPL-MCNC: 7.3 MG/DL (ref 8.4–10.2)
CALCIUM SERPL-MCNC: 7.8 MG/DL (ref 8.4–10.2)
CHLORIDE SERPL-SCNC: 113 MMOL/L (ref 96–108)
CHLORIDE SERPL-SCNC: 113 MMOL/L (ref 96–108)
CHLORIDE SERPL-SCNC: 114 MMOL/L (ref 96–108)
CO2 SERPL-SCNC: 24 MMOL/L (ref 21–32)
CO2 SERPL-SCNC: 27 MMOL/L (ref 21–32)
CO2 SERPL-SCNC: 27 MMOL/L (ref 21–32)
CREAT SERPL-MCNC: 1.1 MG/DL (ref 0.6–1.3)
CREAT SERPL-MCNC: 1.11 MG/DL (ref 0.6–1.3)
CREAT SERPL-MCNC: 1.17 MG/DL (ref 0.6–1.3)
EOSINOPHIL # BLD AUTO: 0.01 THOUSAND/ÂΜL (ref 0–0.61)
EOSINOPHIL NFR BLD AUTO: 0 % (ref 0–6)
ERYTHROCYTE [DISTWIDTH] IN BLOOD BY AUTOMATED COUNT: 12 % (ref 11.6–15.1)
GFR SERPL CREATININE-BSD FRML MDRD: 67 ML/MIN/1.73SQ M
GFR SERPL CREATININE-BSD FRML MDRD: 72 ML/MIN/1.73SQ M
GFR SERPL CREATININE-BSD FRML MDRD: 73 ML/MIN/1.73SQ M
GLUCOSE SERPL-MCNC: 108 MG/DL (ref 65–140)
GLUCOSE SERPL-MCNC: 109 MG/DL (ref 65–140)
GLUCOSE SERPL-MCNC: 112 MG/DL (ref 65–140)
GLUCOSE SERPL-MCNC: 117 MG/DL (ref 65–140)
GLUCOSE SERPL-MCNC: 118 MG/DL (ref 65–140)
GLUCOSE SERPL-MCNC: 125 MG/DL (ref 65–140)
GLUCOSE SERPL-MCNC: 136 MG/DL (ref 65–140)
GLUCOSE SERPL-MCNC: 137 MG/DL (ref 65–140)
GLUCOSE SERPL-MCNC: 150 MG/DL (ref 65–140)
GLUCOSE SERPL-MCNC: 189 MG/DL (ref 65–140)
GLUCOSE SERPL-MCNC: 191 MG/DL (ref 65–140)
GLUCOSE SERPL-MCNC: 220 MG/DL (ref 65–140)
GLUCOSE SERPL-MCNC: 241 MG/DL (ref 65–140)
GLUCOSE SERPL-MCNC: 301 MG/DL (ref 65–140)
GLUCOSE SERPL-MCNC: 69 MG/DL (ref 65–140)
GLUCOSE SERPL-MCNC: 78 MG/DL (ref 65–140)
GLUCOSE SERPL-MCNC: 89 MG/DL (ref 65–140)
GLUCOSE SERPL-MCNC: 89 MG/DL (ref 65–140)
GLUCOSE SERPL-MCNC: 91 MG/DL (ref 65–140)
GLUCOSE SERPL-MCNC: 92 MG/DL (ref 65–140)
HCO3 BLDV-SCNC: 26 MMOL/L (ref 24–30)
HCO3 BLDV-SCNC: 26.6 MMOL/L (ref 24–30)
HCT VFR BLD AUTO: 36.9 % (ref 36.5–49.3)
HGB BLD-MCNC: 12.1 G/DL (ref 12–17)
IMM GRANULOCYTES # BLD AUTO: 0.02 THOUSAND/UL (ref 0–0.2)
IMM GRANULOCYTES NFR BLD AUTO: 0 % (ref 0–2)
LYMPHOCYTES # BLD AUTO: 2.22 THOUSANDS/ÂΜL (ref 0.6–4.47)
LYMPHOCYTES NFR BLD AUTO: 27 % (ref 14–44)
MAGNESIUM SERPL-MCNC: 2.9 MG/DL (ref 1.9–2.7)
MCH RBC QN AUTO: 27.6 PG (ref 26.8–34.3)
MCHC RBC AUTO-ENTMCNC: 32.8 G/DL (ref 31.4–37.4)
MCV RBC AUTO: 84 FL (ref 82–98)
MONOCYTES # BLD AUTO: 0.53 THOUSAND/ÂΜL (ref 0.17–1.22)
MONOCYTES NFR BLD AUTO: 6 % (ref 4–12)
NEUTROPHILS # BLD AUTO: 5.55 THOUSANDS/ÂΜL (ref 1.85–7.62)
NEUTS SEG NFR BLD AUTO: 67 % (ref 43–75)
NON VENT ROOM AIR: ABNORMAL %
NON VENT ROOM AIR: ABNORMAL %
NRBC BLD AUTO-RTO: 0 /100 WBCS
O2 CT BLDV-SCNC: 12 ML/DL
O2 CT BLDV-SCNC: 18.3 ML/DL
PCO2 BLDV: 29 MM HG (ref 42–50)
PCO2 BLDV: 44.5 MM HG (ref 42–50)
PH BLDV: 7.39 [PH] (ref 7.3–7.4)
PH BLDV: 7.57 [PH] (ref 7.3–7.4)
PHOSPHATE SERPL-MCNC: 1.4 MG/DL (ref 2.7–4.5)
PHOSPHATE SERPL-MCNC: 2.9 MG/DL (ref 2.7–4.5)
PHOSPHATE SERPL-MCNC: 2.9 MG/DL (ref 2.7–4.5)
PLATELET # BLD AUTO: 207 THOUSANDS/UL (ref 149–390)
PMV BLD AUTO: 11.5 FL (ref 8.9–12.7)
PO2 BLDV: 149 MM HG (ref 35–45)
PO2 BLDV: 34.6 MM HG (ref 35–45)
POTASSIUM SERPL-SCNC: 3.3 MMOL/L (ref 3.5–5.3)
POTASSIUM SERPL-SCNC: 3.7 MMOL/L (ref 3.5–5.3)
POTASSIUM SERPL-SCNC: 3.7 MMOL/L (ref 3.5–5.3)
PROT SERPL-MCNC: 5.3 G/DL (ref 6.4–8.4)
RBC # BLD AUTO: 4.39 MILLION/UL (ref 3.88–5.62)
SODIUM SERPL-SCNC: 144 MMOL/L (ref 135–147)
SODIUM SERPL-SCNC: 145 MMOL/L (ref 135–147)
SODIUM SERPL-SCNC: 145 MMOL/L (ref 135–147)
WBC # BLD AUTO: 8.34 THOUSAND/UL (ref 4.31–10.16)

## 2023-09-02 PROCEDURE — 83735 ASSAY OF MAGNESIUM: CPT | Performed by: PHYSICIAN ASSISTANT

## 2023-09-02 PROCEDURE — 86341 ISLET CELL ANTIBODY: CPT | Performed by: INTERNAL MEDICINE

## 2023-09-02 PROCEDURE — 82805 BLOOD GASES W/O2 SATURATION: CPT | Performed by: NURSE PRACTITIONER

## 2023-09-02 PROCEDURE — 84100 ASSAY OF PHOSPHORUS: CPT | Performed by: PHYSICIAN ASSISTANT

## 2023-09-02 PROCEDURE — 80069 RENAL FUNCTION PANEL: CPT | Performed by: PHYSICIAN ASSISTANT

## 2023-09-02 PROCEDURE — 99233 SBSQ HOSP IP/OBS HIGH 50: CPT | Performed by: ANESTHESIOLOGY

## 2023-09-02 PROCEDURE — 86337 INSULIN ANTIBODIES: CPT | Performed by: INTERNAL MEDICINE

## 2023-09-02 PROCEDURE — 99254 IP/OBS CNSLTJ NEW/EST MOD 60: CPT | Performed by: INTERNAL MEDICINE

## 2023-09-02 PROCEDURE — 80053 COMPREHEN METABOLIC PANEL: CPT | Performed by: PHYSICIAN ASSISTANT

## 2023-09-02 PROCEDURE — 82948 REAGENT STRIP/BLOOD GLUCOSE: CPT

## 2023-09-02 PROCEDURE — 85025 COMPLETE CBC W/AUTO DIFF WBC: CPT | Performed by: PHYSICIAN ASSISTANT

## 2023-09-02 RX ORDER — INSULIN LISPRO 100 [IU]/ML
1-5 INJECTION, SOLUTION INTRAVENOUS; SUBCUTANEOUS
Status: DISCONTINUED | OUTPATIENT
Start: 2023-09-03 | End: 2023-09-03

## 2023-09-02 RX ORDER — POTASSIUM CHLORIDE 20 MEQ/1
40 TABLET, EXTENDED RELEASE ORAL ONCE
Status: COMPLETED | OUTPATIENT
Start: 2023-09-02 | End: 2023-09-02

## 2023-09-02 RX ORDER — POTASSIUM CHLORIDE 20 MEQ/1
20 TABLET, EXTENDED RELEASE ORAL ONCE
Status: COMPLETED | OUTPATIENT
Start: 2023-09-02 | End: 2023-09-02

## 2023-09-02 RX ORDER — INSULIN GLARGINE 100 [IU]/ML
18 INJECTION, SOLUTION SUBCUTANEOUS
Status: DISCONTINUED | OUTPATIENT
Start: 2023-09-02 | End: 2023-09-05

## 2023-09-02 RX ORDER — INSULIN LISPRO 100 [IU]/ML
1-5 INJECTION, SOLUTION INTRAVENOUS; SUBCUTANEOUS
Status: DISCONTINUED | OUTPATIENT
Start: 2023-09-02 | End: 2023-09-03

## 2023-09-02 RX ORDER — INSULIN LISPRO 100 [IU]/ML
5 INJECTION, SOLUTION INTRAVENOUS; SUBCUTANEOUS
Status: DISCONTINUED | OUTPATIENT
Start: 2023-09-03 | End: 2023-09-05

## 2023-09-02 RX ADMIN — DEXTROSE, SODIUM CHLORIDE, SODIUM LACTATE, POTASSIUM CHLORIDE, AND CALCIUM CHLORIDE 125 ML/HR: 5; .6; .31; .03; .02 INJECTION, SOLUTION INTRAVENOUS at 06:20

## 2023-09-02 RX ADMIN — SODIUM CHLORIDE 14.6 UNITS/HR: 9 INJECTION, SOLUTION INTRAVENOUS at 00:20

## 2023-09-02 RX ADMIN — INSULIN GLARGINE 18 UNITS: 100 INJECTION, SOLUTION SUBCUTANEOUS at 22:12

## 2023-09-02 RX ADMIN — ENOXAPARIN SODIUM 40 MG: 40 INJECTION SUBCUTANEOUS at 08:01

## 2023-09-02 RX ADMIN — SODIUM CHLORIDE 8 UNITS/HR: 9 INJECTION, SOLUTION INTRAVENOUS at 17:15

## 2023-09-02 RX ADMIN — DEXTROSE, SODIUM CHLORIDE, SODIUM LACTATE, POTASSIUM CHLORIDE, AND CALCIUM CHLORIDE 250 ML/HR: 5; .6; .31; .03; .02 INJECTION, SOLUTION INTRAVENOUS at 02:00

## 2023-09-02 RX ADMIN — POTASSIUM CHLORIDE 40 MEQ: 1500 TABLET, EXTENDED RELEASE ORAL at 02:16

## 2023-09-02 RX ADMIN — CHLORHEXIDINE GLUCONATE 0.12% ORAL RINSE 15 ML: 1.2 LIQUID ORAL at 08:01

## 2023-09-02 RX ADMIN — POTASSIUM CHLORIDE 40 MEQ: 1500 TABLET, EXTENDED RELEASE ORAL at 08:01

## 2023-09-02 RX ADMIN — DIBASIC SODIUM PHOSPHATE, MONOBASIC POTASSIUM PHOSPHATE AND MONOBASIC SODIUM PHOSPHATE 2 TABLET: 852; 155; 130 TABLET ORAL at 02:18

## 2023-09-02 RX ADMIN — SODIUM CHLORIDE 0.5 UNITS/HR: 9 INJECTION, SOLUTION INTRAVENOUS at 08:02

## 2023-09-02 RX ADMIN — SODIUM PHOSPHATE, MONOBASIC, MONOHYDRATE AND SODIUM PHOSPHATE, DIBASIC, ANHYDROUS 30 MMOL: 142; 276 INJECTION, SOLUTION INTRAVENOUS at 02:17

## 2023-09-02 RX ADMIN — POTASSIUM CHLORIDE 20 MEQ: 1500 TABLET, EXTENDED RELEASE ORAL at 05:10

## 2023-09-02 NOTE — PROGRESS NOTES
[address]  Progress Note  Name: Johanna Willson  MRN: [id number]  Unit/Bed#:  I Date of Admission: 9/1/2023   Date of Service: 9/2/2023 I Hospital Day: 1    Assessment/Plan   Metabolic acidosis  Assessment & Plan  · In the setting of DKA, now improving  · Will likely correct quickly with IV hydration  · Trend labs    JUAN (acute kidney injury) ([address])  Assessment & Plan  · Baseline creatinine unknown  · Creatinine on admission 2.06, improved to 1.57 on first set of repeat labs  · May be pre-renal in the setting of DKA  · Will continue with IV hydration and monitor  · Avoid nephrotoxic agents  · Strict I/Os  · Trend renal indices    Pseudohyponatremia  Assessment & Plan  · In the setting of hyperglycemia  · Now improved    Leukocytosis  Assessment & Plan  · WBC slightly elevated  · Likely reactive in the setting of DKA  · Follow WBC and fever curve  · Will check procal  · Observe off antibiotics    * DKA (diabetic ketoacidosis) ([address])  Assessment & Plan  No results found for: "HGBA1C"    Recent Labs     09/01/23  2058 09/01/23  2200 09/01/23  2301 09/02/23  0031   POCGLU 296* 249* 185* 117     · Pt without previously known history of DM, reported he was told by his physician his glucose was high on lab work  · Patient also reports nausea and poor appetite  · In ED was found to be in DKA with glucose of 807, elevated hydroxybuterate   · Given insulin bolus and DKA drip started  · Will continue with IV hydration  · Keep NPO for now  · Will consult endocrine for recommendations once DKA resolved               ICU Core Measures     A: Assess, Prevent, and Manage Pain · Has pain been assessed? Yes  · Need for changes to pain regimen? No   B: Both SAT/SAT  · N/A   C: Choice of Sedation · RASS Goal: 0 Alert and Calm or N/A patient not on sedation  · Need for changes to sedation or analgesia regimen? NA   D: Delirium · CAM-ICU: Negative   E: Early Mobility  · Plan for early mobility?  Yes   F: Family Engagement · Plan for family engagement today? Yes         Prophylaxis:  VTE VTE covered by:  enoxaparin, Subcutaneous       Stress Ulcer  not ordered       Subjective   HPI/24hr events: Admitted with DKA. No previous history of diabetes. Review of Systems   Constitutional: Positive for fatigue. Negative for chills and fever. HENT: Negative. Eyes: Negative. Respiratory: Negative for cough, shortness of breath, wheezing and stridor. Cardiovascular: Negative for chest pain, palpitations and leg swelling. Gastrointestinal: Negative for abdominal distention, abdominal pain, blood in stool, diarrhea, nausea and vomiting. Endocrine: Negative. Genitourinary: Negative for dysuria, flank pain, frequency and urgency. Musculoskeletal: Negative. Skin: Negative for rash and wound. Allergic/Immunologic: Negative. Neurological: Negative for dizziness, syncope, facial asymmetry, weakness, light-headedness, numbness and headaches. Hematological: Negative for adenopathy. Does not bruise/bleed easily. Psychiatric/Behavioral: Negative. Objective                            Vitals I/O      Most Recent Min/Max in 24hrs   Temp 97.9 °F (36.6 °C) Temp  Min: 97.6 °F (36.4 °C)  Max: 98.7 °F (37.1 °C)   Pulse 68 Pulse  Min: 65  Max: 76   Resp 17 Resp  Min: 15  Max: 20   /64 BP  Min: 127/64  Max: 154/93   O2 Sat 100 % SpO2  Min: 99 %  Max: 100 %      Intake/Output Summary (Last 24 hours) at 9/2/2023 0037  Last data filed at 9/1/2023 2354  Gross per 24 hour   Intake 1191.37 ml   Output 2525 ml   Net -1333.63 ml         Diet NPO     Invasive Monitoring Physical exam    Physical Exam  Eyes:      Extraocular Movements: Extraocular movements intact. Pupils: Pupils are equal, round, and reactive to light. Skin:     General: Skin is warm. HENT:      Head: Normocephalic and atraumatic. Cardiovascular:      Rate and Rhythm: Normal rate and regular rhythm. Pulses: Normal pulses. Heart sounds: Normal heart sounds. Abdominal:      General: Bowel sounds are normal.      Palpations: Abdomen is soft. Constitutional:       Appearance: He is well-developed. Pulmonary:      Effort: Pulmonary effort is normal.      Breath sounds: Normal breath sounds. Neurological:      General: No focal deficit present. Mental Status: He is alert and oriented to person, place and time.             Diagnostic Studies      EKG: NSR       Medications:  Scheduled PRN   chlorhexidine, 15 mL, Q12H DARI  enoxaparin, 40 mg, Daily      acetaminophen, 650 mg, Q6H PRN  ondansetron, 4 mg, Q6H PRN  sodium chloride (PF), 3 mL, Q1H PRN       Continuous    dextrose 5% lactated ringer's, 250 mL/hr, Last Rate: 250 mL/hr (09/01/23 2202)  insulin regular (HumuLIN R,NovoLIN R) 1 Units/mL in sodium chloride 0.9 % 100 mL infusion, 0.1-30 Units/hr, Last Rate: 14.6 Units/hr (09/02/23 0020)  lactated ringers, 250 mL/hr, Last Rate: 250 mL/hr (09/01/23 2333)         Labs:    CBC    Recent Labs     09/01/23  1444   WBC 11.64*   HGB 16.0   HCT 51.0*        BMP    Recent Labs     09/01/23  1859 09/01/23 2011   SODIUM 138 141   K 3.8 3.7    105   CO2 16* 20*   AGAP 21 16   BUN 71* 68*   CREATININE 1.68* 1.57*   CALCIUM 7.8* 8.2*       Coags    No recent results     Additional Electrolytes  Recent Labs     09/01/23 2011   MG 3.3*   PHOS 2.7          Blood Gas    No recent results  Recent Labs     09/01/23 2011   PHVEN 7.369   [id number] 36.9*   PO2VEN 41.6   [id number] 20.8*   BEVEN -3.8    LFTs  Recent Labs     09/01/23  1444 09/01/23 2011   ALT 21  --    AST 32  --    ALKPHOS 118*  --    ALB 4.2 3.7   TBILI 0.62  --        Infectious  Recent Labs     09/01/23 2011   PROCALCITONI 0.21     Glucose  Recent Labs     09/01/23  1444 09/01/23  1859 09/01/23 2011   GLUC 807* 491* 387*               No CC time    BINA Mcguire

## 2023-09-02 NOTE — UTILIZATION REVIEW
Initial Clinical Review    Admission: Date/Time/Statement:   Admission Orders (From admission, onward)     Ordered        09/01/23 [address]  Once            09/01/23 1732  Inpatient Admission  Once                      09/01/23 1725  INPATIENT ADMISSION  Once        Transfer Service: Anesthesiology    Question Answer Comment   Level of Care Level 1 Stepdown    Estimated length of stay More than 2 Midnights    Certification I certify that inpatient services are medically necessary for this patient for a duration of greater than two midnights. See H&P and MD Progress Notes for additional information about the patient's course of treatment. 09/01/23 1724       ED Arrival Information     Expected   -    Arrival   9/1/2023 14:08    Acuity   Emergent            Means of arrival   Walk-In    Escorted by   Family Member    Service   Hospitalist    Admission type   Emergency            Arrival complaint   ABNORMAL LAB           Chief Complaint   Patient presents with   • Abnormal Lab     Pt reports that he went to the doc and they told him his BS is high , pt reports that he has nausea and not a good appetite        Initial Presentation: 60 y.o. male presents to ED  From home with nausea, poor appetite and abnormal OP labs. PCP  Referred him to ED with elevated  Blood sugar. No prior history of  DM  Or  DKA. Blood sugar  807,  BH elevated and acidosis noted  On labs. Admit  Ip with   DKA, JUAN, [place] acidosis and Leukocytosis and plan is  Insulin drip, IVF, monitor labs, strict  I & O, and hold antibiotics for now. Date:    9/2        Day 2:   Endocrine consult  Still with significant nausea and vomiting. Continue  Insulin. Alert/oriented. New onset  DM< may  Have type  1  Or  DKA perone  Type  2 diabetes. IVF  D/c. Remains on insulin drip. Likely transition to med surg unit. Transitioning  To  SSI.       ED Triage Vitals   Temperature Pulse Respirations Blood Pressure SpO2 09/01/23 1417 09/01/23 1417 09/01/23 1417 09/01/23 1417 09/01/23 1417   97.6 °F (36.4 °C) 75 18 154/93 100 %      Temp Source Heart Rate Source Patient Position - Orthostatic VS BP Location FiO2 (%)   09/01/23 1945 09/01/23 1417 09/01/23 1711 09/01/23 1711 --   Oral Monitor Lying Left arm       Pain Score       09/01/23 1940       No Pain          Wt Readings from Last 1 Encounters:   09/02/23 73.2 kg (161 lb 6 oz)     Additional Vital Signs:   98.3 °F (36.8 °C) 63 17 118/59 80 98 % -- --    09/02/23 0900 -- 75 24 Abnormal  -- -- 100 % -- --   09/02/23 0700 98.4 °F (36.9 °C) 67 13 125/70 92 100 % None (Room air) --   09/02/23 0500 -- 58 15 130/68 93 99 % -- --   09/02/23 0257 97.8 °F (36.6 °C) 56 12 135/65 91 99 % None (Room air) Lying   09/01/23 2244 97.9 °F (36.6 °C) 56 16 127/64 89 -- None (Room air) Lying   09/01/23 1945 98.7 °F (37.1 °C) 68 17 128/71 94 100 % None (Room air) Lying   09/01/23 1900 -- 71 20 138/74 98 100 % -- --   09/01/23 1845 -- 71 20 132/69 93 100 % None (Room air) --   09/01/23 1730 -- 65 15 151/78 108 99 % None (Room air) --   09/01/23 1711 -- 76 16 143/82 107 -- None (Room air) Lying   09/01/23 1417 97.6 °F (36.4 °C) 75 18 154/93 -- 100 % -- --       Pertinent Labs/Diagnostic Test Results:     Results from last 7 days   Lab Units 09/02/23  0429 09/01/23  1444   WBC Thousand/uL 8.34 11.64*   HEMOGLOBIN g/dL 12.1 16.0   HEMATOCRIT % 36.9 51.0*   PLATELETS Thousands/uL 207 318   NEUTROS ABS Thousands/µL 5.55 8.01*         Results from last 7 days   Lab Units 09/02/23  0429 09/02/23  0032 09/01/23 2011 09/01/23  1859 09/01/23  1444   SODIUM mmol/L 145  144 145 141 138 128*   POTASSIUM mmol/L 3.7  3.7 3.3* 3.7 3.8 4.5   CHLORIDE mmol/L 113*  113* 114* 105 101 86*   CO2 mmol/L 27  27 24 20* 16* 13*   ANION GAP mmol/L 5  4 7 16 21 29   BUN mg/dL 42*  42* 51* 68* 71* 79*   CREATININE mg/dL 1.11  1.10 1.17 1.57* 1.68* 2.06*   EGFR ml/min/1.73sq m 72  73 67 47 43 34   CALCIUM mg/dL 7.3* 7. 2* 7.8* 8.2* 7.8* 8.8   MAGNESIUM mg/dL 2.9*  2.9* 2.9* 3.3*  --   --    PHOSPHORUS mg/dL 2.9  2.9 1.4* 2.7  --   --      Results from last 7 days   Lab Units 09/02/23  0429 09/02/23  0032 09/01/23 2011 09/01/23  1444   AST U/L 21  --   --  32   ALT U/L 15  --   --  21   ALK PHOS U/L 67  --   --  118*   TOTAL PROTEIN g/dL 5.3*  --   --  8.3   ALBUMIN g/dL 2.9*  2.8* 3.0* 3.7 4.2   TOTAL BILIRUBIN mg/dL 0.39  --   --  0.62     Results from last 7 days   Lab Units 09/02/23  1209 09/02/23  1016 09/02/23  0814 09/02/23  0703 09/02/23  0601 09/02/23  0523 09/02/23  0435 09/02/23  0404 09/02/23  0258 09/02/23  0157 09/02/23  0100 09/02/23  0031   POC GLUCOSE mg/dl 189* 241* 137 112 89 89 91 69 78 118 125 117     Results from last 7 days   Lab Units 09/02/23  0429 09/02/23  0032 09/01/23 2011 09/01/23  1859 09/01/23  1444   GLUCOSE RANDOM mg/dL 108  109 136 387* 491* 807*             BETA-HYDROXYBUTYRATE   Date Value Ref Range Status   09/01/2023 6.9 (H) <0.6 mmol/L Final          Results from last 7 days   Lab Units 09/02/23  0511 09/02/23  0032 09/01/23 2011   PH SHEA  7.394 7.571* 7.369   PCO2 SHEA mm Hg 44.5 29.0* 36.9*   PO2 SHEA mm Hg 34.6* 149.0* 41.6   HCO3 SHEA mmol/L 26.6 26.0 20.8*   BASE EXC SHEA mmol/L 1.3 4.7 -3.8   O2 CONTENT SHEA ml/dL 12.0 18.3 16.6   O2 HGB, VENOUS % 63.8 96.6* 73.8                             Results from last 7 days   Lab Units 09/01/23 2011   PROCALCITONIN ng/ml 0.21               Results from last 7 days   Lab Units 09/01/23  1444   LIPASE u/L 94*                 Results from last 7 days   Lab Units 09/01/23  1831   CLARITY UA  Clear   COLOR UA  Colorless   SPEC GRAV UA  1.023   PH UA  5.0   GLUCOSE UA mg/dl >=1000 (1%)*   KETONES UA mg/dl 40 (2+)*   BLOOD UA  Negative   PROTEIN UA mg/dl Negative   NITRITE UA  Negative   BILIRUBIN UA  Negative   UROBILINOGEN UA (BE) mg/dl <2.0   LEUKOCYTES UA  Elevated glucose may cause decreased leukocyte values.  See urine microscopic for [facility] result*   WBC UA /hpf None Seen   RBC UA /hpf None Seen   BACTERIA UA /hpf Occasional   EPITHELIAL CELLS WET PREP /hpf None Seen   MUCUS THREADS  Occasional*             ED Treatment:   Medication Administration from 09/01/2023 1408 to 09/01/2023 1936       Date/Time Order Dose Route Action Comments     09/01/2023 1600 EDT sodium chloride 0.9 % bolus 1,000 mL 0 mL Intravenous Stopped --     09/01/2023 1500 EDT sodium chloride 0.9 % bolus 1,000 mL 1,000 mL Intravenous New Bag --     09/01/2023 1655 EDT sodium chloride 0.9 % bolus 2,000 mL 2,000 mL Intravenous New Bag --     09/01/2023 1651 EDT insulin regular (HumuLIN R,NovoLIN R) injection 10 Units 10 Units Intravenous Given --     09/01/2023 1840 EDT insulin regular (HumuLIN R,NovoLIN R) 1 Units/mL in sodium chloride 0.9 % 100 mL infusion 7.3 Units/hr Intravenous Rate/Dose Change --     09/01/2023 1815 EDT insulin regular (HumuLIN R,NovoLIN R) 1 Units/mL in sodium chloride 0.9 % 100 mL infusion 14.6 Units/hr Intravenous Rate/Dose Change --     09/01/2023 1702 EDT insulin regular (HumuLIN R,NovoLIN R) 1 Units/mL in sodium chloride 0.9 % 100 mL infusion 7.3 Units/hr Intravenous New Bag --          Admitting Diagnosis: Dehydration [E86.0]  DKA (diabetic ketoacidosis) ([address]) [E11.10]  Abnormal laboratory test result [R89.9]  Acute renal failure (ARF) ([address]) [N17.9]  Age/Sex: 60 y.o. male  Admission Orders:  Scheduled Medications:  chlorhexidine, 15 mL, Mouth/Throat, Q12H [address]  enoxaparin, 40 mg, Subcutaneous, Daily  insulin glargine, 18 Units, Subcutaneous, HS  [START ON 9/3/2023] insulin lispro, 1-5 Units, Subcutaneous, TID AC  insulin lispro, 1-5 Units, Subcutaneous, HS  [START ON 9/3/2023] insulin lispro, 5 Units, Subcutaneous, TID With Meals      Continuous IV Infusions:  insulin regular (HumuLIN R,NovoLIN R) 1 Units/mL in sodium chloride 0.9 % 100 mL infusion, 0.3-21 Units/hr, Intravenous, Continuous  IVF   125/hr - d/c   9/2      PRN Meds:  acetaminophen, 650 mg, Oral, Q6H PRN  ondansetron, 4 mg, Intravenous, Q6H PRN  sodium chloride (PF), 3 mL, Intravenous, Q1H PRN        IP CONSULT TO CASE MANAGEMENT  IP CONSULT TO ENDOCRINOLOGY    Network Utilization Review Department  ATTENTION: Please call with any questions or concerns to [phone] and carefully listen to the prompts so that you are directed to the right person. All voicemails are confidential.  Rafael Jason all requests for admission clinical reviews, approved or denied determinations and any other requests to dedicated fax number below belonging to the campus where the patient is receiving treatment.  List of dedicated fax numbers for the Facilities:  [place] DENIALS (Administrative/Medical Necessity) [phone] [address] (Maternity/NICU/Pediatrics) [phone]   [address] [place] [address] Drive [phone]   [facility] [phone]   [address] [place] [address] [phone]   [address] [address] [phone]

## 2023-09-02 NOTE — ASSESSMENT & PLAN NOTE
No results found for: "HGBA1C"    Recent Labs     09/02/23  0703 09/02/23  0814 09/02/23  1016 09/02/23  1209   POCGLU 112 137 241* 189*     · Pt without previously known history of DM, reported he was told by his physician his glucose was high on lab work  · Patient also reports nausea and poor appetite  · In ED was found to be in DKA with glucose of 807, elevated hydroxybuterate   · Given insulin bolus and DKA drip started  · Anion gap closed last evening  · Tolerating PO diet, transitioned to normal insulin infusion  · Endocrinology evaluated patient; Plan tonight Lantus 18 U, Novalog 5 U TID with SSI  · Type 1 DM labs pending; A1C pending

## 2023-09-02 NOTE — CONSULTS
REQUIRED DOCUMENTATION:      1. This service was provided via Telemedicine -[employer]. 2. Provider located at [place], [place]. 3. TeleMed provider: Mary Zheng MD.  4. Identify all parties in room with patient during tele consult: 5. Patient was then informed that this was a Telemedicine visit and that the exam was being conducted confidentially over secure lines. My office door was closed. No one else was in the room. Patient acknowledged consent and understanding of privacy and security of the Telemedicine visit, and gave us permission to have the assistant stay in the room in order to assist with the history and to conduct the exam.  I informed the patient that I have reviewed their record in Epic and presented the opportunity for them to ask any questions regarding the visit today. The patient agreed to participate. Patient is aware this is a billable service. Consultation - [facility] Endocrinology    Patient Information: Nabor Hannon 60 y.o. male MRN: [id number]  Unit/Bed#:  Encounter: [id number]  Admitting Physician: Mary Zheng MD  PCP: No primary care provider on file. Date of Consultation:  09/02/23    ASSESSMENT:      PLAN:    1.  DKA. He seems to be improving. More alert and orientated. Still has significant nausea and vomiting. Continue with IV fluids. May transition from DKA to a non-DKA IV insulin infusion therapy protocol. Since he now has been transitioned to diet, we may transition tonight to basal/bolus and correctional insulin. 2.  New onset diabetes. We will treat as type I/MICHAEL. We will check A1c. He may have either type I or DKA prone type 2 diabetes. We will initiate work-up with antibodies and a C-peptide level. Note fasting IV insulin requirement was approximately 0.9 units/h.     We may transition to basal bolus insulin therapy using Lantus 18 units nightly starting tonight and Humalog 5 units 3 times daily AC plus correction with algorithm 1. Goal capillary glucose is 100-1 80 mg/dL. He may be discharged on basal bolus insulin regimen. He will need outpatient follow-up with endocrinology or PCP to review the labs and then make further management decisions. 3.  JUAN. Probably prerenal from DKA. Continue IV fluids until he is euvolemic and renal functions have improved. Will defer management to the primary service. 4. Suspected HLD. We will check lipid profile. 5. Suspected vitamin D deficiency. Total time spent was 42 min of which >50% was in coordination of care. Reason for consultation:   New onset diabetes with DKA    History of Present Illness:    Yusef Gallegos is a 60 y.o. male with no known medical issues. He presented with few days history of progressive nausea, vomiting and loss of appetite. Eventually presented to the emergency room where he was found to have severe hyperglycemia, metabolic acidosis and elevated beta hydroxybutyrate of 6.9 mmol/L. He was also found to be in prerenal JUAN. Review of Systems:    Review of Systems   Constitutional: Positive for activity change and appetite change. HENT: Negative. Eyes: Negative. Respiratory: Negative. Cardiovascular: Negative for chest pain. Gastrointestinal: Negative. Endocrine: Negative. Genitourinary: Negative. Musculoskeletal: Negative. Skin: Negative. Allergic/Immunologic: Negative. Neurological: Negative. Hematological: Negative. Psychiatric/Behavioral: Negative. All other systems reviewed and are negative. Past Medical and Surgical History:     History reviewed. No pertinent past medical history. History reviewed. No pertinent surgical history. Meds/Allergies:    PTA meds:   None       Allergies:    Allergies   Allergen Reactions   • Penicillins Edema     History:     Marital Status: /Civil Union   Occupation:   Substance Use History:   Social History Substance and Sexual Activity   Alcohol Use Not Currently     Social History     Tobacco Use   Smoking Status Never   Smokeless Tobacco Never     Social History     Substance and Sexual Activity   Drug Use Never       Family History:    Family History   Problem Relation Age of Onset   • Diabetes Father    • Diabetes Maternal Uncle        Physical Exam:     Vitals:   Blood Pressure: 118/59 (09/02/23 1100)  Pulse: 63 (09/02/23 1100)  Temperature: 98.3 °F (36.8 °C) (09/02/23 1100)  Temp Source: Oral (09/02/23 1100)  Respirations: 17 (09/02/23 1100)  Height: 5' 6" (167.6 cm) (09/01/23 1712)  Weight - Scale: 73.2 kg (161 lb 6 oz) (09/02/23 0559)  SpO2: 98 % (09/02/23 1100)    Physical Exam  Constitutional:       General: He is not in acute distress. Appearance: He is well-developed. HENT:      Head: Normocephalic and atraumatic. Nose: Nose normal.   Eyes:      Conjunctiva/sclera: Conjunctivae normal.   Pulmonary:      Effort: Pulmonary effort is normal.   Abdominal:      General: There is no distension. Musculoskeletal:      Cervical back: Normal range of motion and neck supple. Skin:     Findings: No rash. Comments: No icterus   Neurological:      Mental Status: He is alert and oriented to person, place, and time.            Lab and Imaging Results: I have personally reviewed pertinent films in PACS    Results from last 7 days   Lab Units 09/02/23  0429   WBC Thousand/uL 8.34   HEMOGLOBIN g/dL 12.1   HEMATOCRIT % 36.9   PLATELETS Thousands/uL 207   NEUTROS PCT % 67   LYMPHS PCT % 27   MONOS PCT % 6   EOS PCT % 0     Results from last 7 days   Lab Units 09/02/23  0429   POTASSIUM mmol/L 3.7  3.7   CHLORIDE mmol/L 113*  113*   CO2 mmol/L 27  27   BUN mg/dL 42*  42*   CREATININE mg/dL 1.11  1.10   CALCIUM mg/dL 7.3*  7.2*   ALK PHOS U/L 67   ALT U/L 15   AST U/L 21         POC Glucose (mg/dl)   Date Value   09/02/2023 241 (H)   09/02/2023 137   09/02/2023 112   09/02/2023 89   09/02/2023 89 09/02/2023 91   09/02/2023 69   09/02/2023 78   09/02/2023 118   09/02/2023 125         ** Please Note: Dragon 360 Dictation voice to text software may have been used in the creation of this document.  **

## 2023-09-02 NOTE — ASSESSMENT & PLAN NOTE
Dr Kim Causey at bedside, written procedure consent obtained by Dr Kim Causey via video , ID number [id number] · Baseline creatinine unknown  · Creatinine on admission 2.06, improved to 1.57 on first set of repeat labs  · May be pre-renal in the setting of DKA  · Will continue with IV hydration and monitor  · Avoid nephrotoxic agents  · Strict I/Os  · Trend renal indices

## 2023-09-02 NOTE — ASSESSMENT & PLAN NOTE
· In the setting of DKA, now improving  · Will likely correct quickly with IV hydration  · Trend labs

## 2023-09-02 NOTE — ASSESSMENT & PLAN NOTE
No results found for: "HGBA1C"    Recent Labs     09/01/23 2058 09/01/23  2200 09/01/23  2301 09/02/23  0031   POCGLU 296* 249* 185* 117     · Pt without previously known history of DM, reported he was told by his physician his glucose was high on lab work  · Patient also reports nausea and poor appetite  · In ED was found to be in DKA with glucose of 807, elevated hydroxybuterate   · Given insulin bolus and DKA drip started  · Will continue with IV hydration  · Keep NPO for now  · Will consult endocrine for recommendations once DKA resolved

## 2023-09-02 NOTE — UTILIZATION REVIEW
NOTIFICATION OF INPATIENT ADMISSION   AUTHORIZATION REQUEST   SERVICING FACILITY:   [address]  [facility], [address] [facility]  Tax ID: [id number]  NPI: [id number] ATTENDING PROVIDER:  Attending Name and NPI#: Dorie Jhonny Coreakayden [[id number]]  Address: [facility], [address] [place] [address]  Phone: [phone]     ADMISSION INFORMATION:  Place of Service: [address] [place] [address]  Place of Service Code: 21  Inpatient Admission Date/Time: 9/1/23  5:24 PM  Discharge Date/Time: No discharge date for patient encounter. Admitting Diagnosis Code/Description:  Dehydration [E86.0]  DKA (diabetic ketoacidosis) ([facility]) [E11.10]  Abnormal laboratory test result [R89.9]  Acute renal failure (ARF) ([facility]) [N17.9]     UTILIZATION REVIEW CONTACT:  Brenda Michel, Utilization   Network Utilization Review Department  Phone: [phone]  Fax [phone]  Email: [email or web address]  Contact for approvals/pending authorizations, clinical reviews, and discharge. PHYSICIAN ADVISORY SERVICES:  Medical Necessity Denial & [email or web address] Review  Phone: [phone]  Fax: [phone]  Email: [email or web address]

## 2023-09-03 LAB
ALBUMIN SERPL BCP-MCNC: 2.8 G/DL (ref 3.5–5)
ALP SERPL-CCNC: 68 U/L (ref 34–104)
ALT SERPL W P-5'-P-CCNC: 18 U/L (ref 7–52)
ANION GAP SERPL CALCULATED.3IONS-SCNC: 4 MMOL/L
AST SERPL W P-5'-P-CCNC: 35 U/L (ref 13–39)
BASOPHILS # BLD AUTO: 0.01 THOUSANDS/ÂΜL (ref 0–0.1)
BASOPHILS NFR BLD AUTO: 0 % (ref 0–1)
BILIRUB SERPL-MCNC: 0.49 MG/DL (ref 0.2–1)
BUN SERPL-MCNC: 20 MG/DL (ref 5–25)
C PEPTIDE SERPL-MCNC: 0.8 NG/ML (ref 1.1–4.4)
CA-I BLD-SCNC: 1.08 MMOL/L (ref 1.12–1.32)
CALCIUM ALBUM COR SERPL-MCNC: 8.7 MG/DL (ref 8.3–10.1)
CALCIUM SERPL-MCNC: 7.7 MG/DL (ref 8.4–10.2)
CHLORIDE SERPL-SCNC: 113 MMOL/L (ref 96–108)
CO2 SERPL-SCNC: 23 MMOL/L (ref 21–32)
CREAT SERPL-MCNC: 0.79 MG/DL (ref 0.6–1.3)
EOSINOPHIL # BLD AUTO: 0.03 THOUSAND/ÂΜL (ref 0–0.61)
EOSINOPHIL NFR BLD AUTO: 0 % (ref 0–6)
ERYTHROCYTE [DISTWIDTH] IN BLOOD BY AUTOMATED COUNT: 12.4 % (ref 11.6–15.1)
GFR SERPL CREATININE-BSD FRML MDRD: 98 ML/MIN/1.73SQ M
GLUCOSE SERPL-MCNC: 170 MG/DL (ref 65–140)
GLUCOSE SERPL-MCNC: 230 MG/DL (ref 65–140)
GLUCOSE SERPL-MCNC: 233 MG/DL (ref 65–140)
GLUCOSE SERPL-MCNC: 248 MG/DL (ref 65–140)
GLUCOSE SERPL-MCNC: 249 MG/DL (ref 65–140)
GLUCOSE SERPL-MCNC: 304 MG/DL (ref 65–140)
GLUCOSE SERPL-MCNC: 356 MG/DL (ref 65–140)
GLUCOSE SERPL-MCNC: 362 MG/DL (ref 65–140)
HCT VFR BLD AUTO: 40.5 % (ref 36.5–49.3)
HGB BLD-MCNC: 12.7 G/DL (ref 12–17)
IMM GRANULOCYTES # BLD AUTO: 0.01 THOUSAND/UL (ref 0–0.2)
IMM GRANULOCYTES NFR BLD AUTO: 0 % (ref 0–2)
LYMPHOCYTES # BLD AUTO: 2.24 THOUSANDS/ÂΜL (ref 0.6–4.47)
LYMPHOCYTES NFR BLD AUTO: 32 % (ref 14–44)
MAGNESIUM SERPL-MCNC: 2.7 MG/DL (ref 1.9–2.7)
MCH RBC QN AUTO: 27.5 PG (ref 26.8–34.3)
MCHC RBC AUTO-ENTMCNC: 31.4 G/DL (ref 31.4–37.4)
MCV RBC AUTO: 88 FL (ref 82–98)
MONOCYTES # BLD AUTO: 0.37 THOUSAND/ÂΜL (ref 0.17–1.22)
MONOCYTES NFR BLD AUTO: 5 % (ref 4–12)
NEUTROPHILS # BLD AUTO: 4.38 THOUSANDS/ÂΜL (ref 1.85–7.62)
NEUTS SEG NFR BLD AUTO: 63 % (ref 43–75)
NRBC BLD AUTO-RTO: 0 /100 WBCS
PHOSPHATE SERPL-MCNC: 1.4 MG/DL (ref 2.7–4.5)
PLATELET # BLD AUTO: 151 THOUSANDS/UL (ref 149–390)
PMV BLD AUTO: 11.4 FL (ref 8.9–12.7)
POTASSIUM SERPL-SCNC: 4.5 MMOL/L (ref 3.5–5.3)
PROT SERPL-MCNC: 5.2 G/DL (ref 6.4–8.4)
RBC # BLD AUTO: 4.61 MILLION/UL (ref 3.88–5.62)
SODIUM SERPL-SCNC: 140 MMOL/L (ref 135–147)
WBC # BLD AUTO: 7.04 THOUSAND/UL (ref 4.31–10.16)

## 2023-09-03 PROCEDURE — 85025 COMPLETE CBC W/AUTO DIFF WBC: CPT | Performed by: PHYSICIAN ASSISTANT

## 2023-09-03 PROCEDURE — 80053 COMPREHEN METABOLIC PANEL: CPT | Performed by: PHYSICIAN ASSISTANT

## 2023-09-03 PROCEDURE — 82330 ASSAY OF CALCIUM: CPT | Performed by: PHYSICIAN ASSISTANT

## 2023-09-03 PROCEDURE — 84681 ASSAY OF C-PEPTIDE: CPT | Performed by: PHYSICIAN ASSISTANT

## 2023-09-03 PROCEDURE — 82948 REAGENT STRIP/BLOOD GLUCOSE: CPT

## 2023-09-03 PROCEDURE — 99233 SBSQ HOSP IP/OBS HIGH 50: CPT | Performed by: STUDENT IN AN ORGANIZED HEALTH CARE EDUCATION/TRAINING PROGRAM

## 2023-09-03 PROCEDURE — 84100 ASSAY OF PHOSPHORUS: CPT | Performed by: PHYSICIAN ASSISTANT

## 2023-09-03 PROCEDURE — 83735 ASSAY OF MAGNESIUM: CPT | Performed by: PHYSICIAN ASSISTANT

## 2023-09-03 PROCEDURE — 83036 HEMOGLOBIN GLYCOSYLATED A1C: CPT | Performed by: PHYSICIAN ASSISTANT

## 2023-09-03 PROCEDURE — 83519 RIA NONANTIBODY: CPT | Performed by: PHYSICIAN ASSISTANT

## 2023-09-03 RX ORDER — INSULIN LISPRO 100 [IU]/ML
1-5 INJECTION, SOLUTION INTRAVENOUS; SUBCUTANEOUS
Status: DISCONTINUED | OUTPATIENT
Start: 2023-09-03 | End: 2023-09-05

## 2023-09-03 RX ADMIN — INSULIN LISPRO 1 UNITS: 100 INJECTION, SOLUTION INTRAVENOUS; SUBCUTANEOUS at 08:48

## 2023-09-03 RX ADMIN — INSULIN GLARGINE 18 UNITS: 100 INJECTION, SOLUTION SUBCUTANEOUS at 21:05

## 2023-09-03 RX ADMIN — INSULIN LISPRO 3 UNITS: 100 INJECTION, SOLUTION INTRAVENOUS; SUBCUTANEOUS at 13:38

## 2023-09-03 RX ADMIN — ENOXAPARIN SODIUM 40 MG: 40 INJECTION SUBCUTANEOUS at 08:44

## 2023-09-03 RX ADMIN — INSULIN LISPRO 3 UNITS: 100 INJECTION, SOLUTION INTRAVENOUS; SUBCUTANEOUS at 21:07

## 2023-09-03 RX ADMIN — CHLORHEXIDINE GLUCONATE 0.12% ORAL RINSE 15 ML: 1.2 LIQUID ORAL at 08:44

## 2023-09-03 RX ADMIN — INSULIN LISPRO 5 UNITS: 100 INJECTION, SOLUTION INTRAVENOUS; SUBCUTANEOUS at 13:30

## 2023-09-03 RX ADMIN — INSULIN LISPRO 2 UNITS: 100 INJECTION, SOLUTION INTRAVENOUS; SUBCUTANEOUS at 02:56

## 2023-09-03 RX ADMIN — INSULIN LISPRO 5 UNITS: 100 INJECTION, SOLUTION INTRAVENOUS; SUBCUTANEOUS at 08:30

## 2023-09-03 RX ADMIN — INSULIN LISPRO 5 UNITS: 100 INJECTION, SOLUTION INTRAVENOUS; SUBCUTANEOUS at 17:30

## 2023-09-03 RX ADMIN — CHLORHEXIDINE GLUCONATE 0.12% ORAL RINSE 15 ML: 1.2 LIQUID ORAL at 21:05

## 2023-09-03 RX ADMIN — INSULIN LISPRO 3 UNITS: 100 INJECTION, SOLUTION INTRAVENOUS; SUBCUTANEOUS at 17:30

## 2023-09-03 NOTE — PLAN OF CARE
Problem: Potential for Falls  Goal: Patient will remain free of falls  Description: INTERVENTIONS:  - Educate patient/family on patient safety including physical limitations  - Instruct patient to call for assistance with activity   - Consult OT/PT to assist with strengthening/mobility   - Keep Call bell within reach  - Keep bed low and locked with side rails adjusted as appropriate  - Keep care items and personal belongings within reach  - Initiate and maintain comfort rounds  - Make Fall Risk Sign visible to staff  - Offer Toileting every 2 Hours, in advance of need  - Initiate/Maintain bed alarm  - Obtain necessary fall risk management equipment: bed alarm  - Apply yellow socks and bracelet for high fall risk patients  - Consider moving patient to room near nurses station  9/2/2023 2357 by Douglas Gunn RN  Outcome: Progressing     Problem: PAIN - ADULT  Goal: Verbalizes/displays adequate comfort level or baseline comfort level  Description: Interventions:  - Encourage patient to monitor pain and request assistance  - Assess pain using appropriate pain scale  - Administer analgesics based on type and severity of pain and evaluate response  - Implement non-pharmacological measures as appropriate and evaluate response  - Consider cultural and social influences on pain and pain management  - Notify physician/advanced practitioner if interventions unsuccessful or patient reports new pain  9/2/2023 2357 by Douglas Gunn RN  Outcome: Progressing     Problem: INFECTION - ADULT  Goal: Absence or prevention of progression during hospitalization  Description: INTERVENTIONS:  - Assess and monitor for signs and symptoms of infection  - Monitor lab/diagnostic results  - Monitor all insertion sites, i.e. indwelling lines, tubes, and drains  - Monitor endotracheal if appropriate and nasal secretions for changes in amount and color  - [place] appropriate cooling/warming therapies per order  - Administer medications as ordered  - Instruct and encourage patient and family to use good hand hygiene technique  - Identify and instruct in appropriate isolation precautions for identified infection/condition  9/2/2023 2357 by Beverlyn Closs, RN  Outcome: Progressing  Goal: Absence of fever/infection during neutropenic period  Description: INTERVENTIONS:  - Monitor WBC    9/2/2023 2357 by Beverlyn Closs, RN  Outcome: Progressing     Problem: SAFETY ADULT  Goal: Patient will remain free of falls  Description: INTERVENTIONS:  - Educate patient/family on patient safety including physical limitations  - Instruct patient to call for assistance with activity   - Consult OT/PT to assist with strengthening/mobility   - Keep Call bell within reach  - Keep bed low and locked with side rails adjusted as appropriate  - Keep care items and personal belongings within reach  - Initiate and maintain comfort rounds  - Make Fall Risk Sign visible to staff  - Offer Toileting every 2 Hours, in advance of need  - Initiate/Maintain bed alarm  - Obtain necessary fall risk management equipment: bed alarm  - Apply yellow socks and bracelet for high fall risk patients  - Consider moving patient to room near nurses station  9/2/2023 2357 by Beverlyn Closs, RN  Outcome: Progressing  Goal: Maintain or return to baseline ADL function  Description: INTERVENTIONS:  -  Assess patient's ability to carry out ADLs; assess patient's baseline for ADL function and identify physical deficits which impact ability to perform ADLs (bathing, care of mouth/teeth, toileting, grooming, dressing, etc.)  - Assess/evaluate cause of self-care deficits   - Assess range of motion  - Assess patient's mobility; develop plan if impaired  - Assess patient's need for assistive devices and provide as appropriate  - Encourage maximum independence but intervene and supervise when necessary  - Involve family in performance of ADLs  - Assess for home care needs following discharge   - Consider OT consult to assist with ADL evaluation and planning for discharge  - Provide patient education as appropriate  9/2/2023 2357 by Jane Cordero RN  Outcome: Progressing  Goal: Maintains/Returns to pre admission functional level  Description: INTERVENTIONS:  - Perform BMAT or MOVE assessment daily.   - Set and communicate daily mobility goal to care team and patient/family/caregiver. - Collaborate with rehabilitation services on mobility goals if consulted  - Perform Range of Motion 3 times a day. - Reposition patient every 2 hours. - Dangle patient 3 times a day  - Stand patient 3 times a day  - Ambulate patient 3 times a day  - Out of bed to chair 3 times a day   - Out of bed for meals 3 times a day  - Out of bed for toileting  - Record patient progress and toleration of activity level   9/2/2023 2357 by Jane Cordero RN  Outcome: Progressing     Problem: DISCHARGE PLANNING  Goal: Discharge to home or other facility with appropriate resources  Description: INTERVENTIONS:  - Identify barriers to discharge w/patient and caregiver  - Arrange for needed discharge resources and transportation as appropriate  - Identify discharge learning needs (meds, wound care, etc.)  - Arrange for interpretive services to assist at discharge as needed  - Refer to Case Management Department for coordinating discharge planning if the patient needs post-hospital services based on physician/advanced practitioner order or complex needs related to functional status, cognitive ability, or social support system  9/2/2023 2357 by Jane Cordero RN  Outcome: Progressing    Problem: Knowledge Deficit  Goal: Patient/family/caregiver demonstrates understanding of disease process, treatment plan, medications, and discharge instructions  Description: Complete learning assessment and assess knowledge base.   Interventions:  - Provide teaching at level of understanding  - Provide teaching via preferred learning methods  9/2/2023 2357 by Jane Cordero RN  Outcome: Progressing Problem: Nutrition/Hydration-ADULT  Goal: Nutrient/Hydration intake appropriate for improving, restoring or maintaining nutritional needs  Description: Monitor and assess patient's nutrition/hydration status for malnutrition. Collaborate with interdisciplinary team and initiate plan and interventions as ordered. Monitor patient's weight and dietary intake as ordered or per policy. Utilize nutrition screening tool and intervene as necessary. Determine patient's food preferences and provide high-protein, high-caloric foods as appropriate.      INTERVENTIONS:  - Monitor oral intake, urinary output, labs, and treatment plans  - Assess nutrition and hydration status and recommend course of action  - Evaluate amount of meals eaten  - Assist patient with eating if necessary   - Allow adequate time for meals  - Recommend/ encourage appropriate diets, oral nutritional supplements, and vitamin/mineral supplements  - Order, calculate, and assess calorie counts as needed  - Recommend, monitor, and adjust tube feedings and TPN/PPN based on assessed needs  - Assess need for intravenous fluids  - Provide specific nutrition/hydration education as appropriate  - Include patient/family/caregiver in decisions related to nutrition  9/2/2023 3366 by Douglas Gunn RN  Outcome: Progressing

## 2023-09-04 PROBLEM — E78.5 HYPERLIPIDEMIA: Status: ACTIVE | Noted: 2023-09-04

## 2023-09-04 LAB
25(OH)D3 SERPL-MCNC: 17.9 NG/ML (ref 30–100)
ALBUMIN SERPL BCP-MCNC: 2.9 G/DL (ref 3.5–5)
ALP SERPL-CCNC: 73 U/L (ref 34–104)
ALT SERPL W P-5'-P-CCNC: 22 U/L (ref 7–52)
ANION GAP SERPL CALCULATED.3IONS-SCNC: 3 MMOL/L
AST SERPL W P-5'-P-CCNC: 27 U/L (ref 13–39)
BASOPHILS # BLD AUTO: 0.01 THOUSANDS/ÂΜL (ref 0–0.1)
BASOPHILS NFR BLD AUTO: 0 % (ref 0–1)
BILIRUB SERPL-MCNC: 0.43 MG/DL (ref 0.2–1)
BUN SERPL-MCNC: 18 MG/DL (ref 5–25)
CALCIUM ALBUM COR SERPL-MCNC: 8.9 MG/DL (ref 8.3–10.1)
CALCIUM SERPL-MCNC: 8 MG/DL (ref 8.4–10.2)
CHLORIDE SERPL-SCNC: 112 MMOL/L (ref 96–108)
CHOLEST SERPL-MCNC: 216 MG/DL
CO2 SERPL-SCNC: 26 MMOL/L (ref 21–32)
CREAT SERPL-MCNC: 0.84 MG/DL (ref 0.6–1.3)
EOSINOPHIL # BLD AUTO: 0.07 THOUSAND/ÂΜL (ref 0–0.61)
EOSINOPHIL NFR BLD AUTO: 1 % (ref 0–6)
ERYTHROCYTE [DISTWIDTH] IN BLOOD BY AUTOMATED COUNT: 12.2 % (ref 11.6–15.1)
GFR SERPL CREATININE-BSD FRML MDRD: 95 ML/MIN/1.73SQ M
GLUCOSE SERPL-MCNC: 112 MG/DL (ref 65–140)
GLUCOSE SERPL-MCNC: 112 MG/DL (ref 65–140)
GLUCOSE SERPL-MCNC: 199 MG/DL (ref 65–140)
GLUCOSE SERPL-MCNC: 216 MG/DL (ref 65–140)
GLUCOSE SERPL-MCNC: 299 MG/DL (ref 65–140)
HCT VFR BLD AUTO: 39.5 % (ref 36.5–49.3)
HDLC SERPL-MCNC: 35 MG/DL
HGB BLD-MCNC: 12.2 G/DL (ref 12–17)
IMM GRANULOCYTES # BLD AUTO: 0.01 THOUSAND/UL (ref 0–0.2)
IMM GRANULOCYTES NFR BLD AUTO: 0 % (ref 0–2)
LDLC SERPL CALC-MCNC: 136 MG/DL (ref 0–100)
LYMPHOCYTES # BLD AUTO: 2.15 THOUSANDS/ÂΜL (ref 0.6–4.47)
LYMPHOCYTES NFR BLD AUTO: 39 % (ref 14–44)
MCH RBC QN AUTO: 26.9 PG (ref 26.8–34.3)
MCHC RBC AUTO-ENTMCNC: 30.9 G/DL (ref 31.4–37.4)
MCV RBC AUTO: 87 FL (ref 82–98)
MONOCYTES # BLD AUTO: 0.28 THOUSAND/ÂΜL (ref 0.17–1.22)
MONOCYTES NFR BLD AUTO: 5 % (ref 4–12)
NEUTROPHILS # BLD AUTO: 3 THOUSANDS/ÂΜL (ref 1.85–7.62)
NEUTS SEG NFR BLD AUTO: 55 % (ref 43–75)
NONHDLC SERPL-MCNC: 181 MG/DL
NRBC BLD AUTO-RTO: 0 /100 WBCS
PLATELET # BLD AUTO: 144 THOUSANDS/UL (ref 149–390)
PMV BLD AUTO: 10.9 FL (ref 8.9–12.7)
POTASSIUM SERPL-SCNC: 4.1 MMOL/L (ref 3.5–5.3)
PROT SERPL-MCNC: 5.6 G/DL (ref 6.4–8.4)
RBC # BLD AUTO: 4.53 MILLION/UL (ref 3.88–5.62)
SODIUM SERPL-SCNC: 141 MMOL/L (ref 135–147)
TRIGL SERPL-MCNC: 225 MG/DL
WBC # BLD AUTO: 5.52 THOUSAND/UL (ref 4.31–10.16)

## 2023-09-04 PROCEDURE — 80061 LIPID PANEL: CPT | Performed by: STUDENT IN AN ORGANIZED HEALTH CARE EDUCATION/TRAINING PROGRAM

## 2023-09-04 PROCEDURE — 85025 COMPLETE CBC W/AUTO DIFF WBC: CPT | Performed by: STUDENT IN AN ORGANIZED HEALTH CARE EDUCATION/TRAINING PROGRAM

## 2023-09-04 PROCEDURE — 99233 SBSQ HOSP IP/OBS HIGH 50: CPT | Performed by: STUDENT IN AN ORGANIZED HEALTH CARE EDUCATION/TRAINING PROGRAM

## 2023-09-04 PROCEDURE — 82948 REAGENT STRIP/BLOOD GLUCOSE: CPT

## 2023-09-04 PROCEDURE — 82306 VITAMIN D 25 HYDROXY: CPT | Performed by: STUDENT IN AN ORGANIZED HEALTH CARE EDUCATION/TRAINING PROGRAM

## 2023-09-04 PROCEDURE — 80053 COMPREHEN METABOLIC PANEL: CPT | Performed by: STUDENT IN AN ORGANIZED HEALTH CARE EDUCATION/TRAINING PROGRAM

## 2023-09-04 RX ORDER — ATORVASTATIN CALCIUM 10 MG/1
10 TABLET, FILM COATED ORAL
Status: DISCONTINUED | OUTPATIENT
Start: 2023-09-04 | End: 2023-09-05 | Stop reason: HOSPADM

## 2023-09-04 RX ADMIN — ATORVASTATIN CALCIUM 10 MG: 10 TABLET, FILM COATED ORAL at 16:37

## 2023-09-04 RX ADMIN — INSULIN LISPRO 5 UNITS: 100 INJECTION, SOLUTION INTRAVENOUS; SUBCUTANEOUS at 12:45

## 2023-09-04 RX ADMIN — INSULIN LISPRO 2 UNITS: 100 INJECTION, SOLUTION INTRAVENOUS; SUBCUTANEOUS at 16:37

## 2023-09-04 RX ADMIN — CHLORHEXIDINE GLUCONATE 0.12% ORAL RINSE 15 ML: 1.2 LIQUID ORAL at 08:15

## 2023-09-04 RX ADMIN — INSULIN LISPRO 1 UNITS: 100 INJECTION, SOLUTION INTRAVENOUS; SUBCUTANEOUS at 23:20

## 2023-09-04 RX ADMIN — CHLORHEXIDINE GLUCONATE 0.12% ORAL RINSE 15 ML: 1.2 LIQUID ORAL at 23:20

## 2023-09-04 RX ADMIN — INSULIN LISPRO 5 UNITS: 100 INJECTION, SOLUTION INTRAVENOUS; SUBCUTANEOUS at 08:15

## 2023-09-04 RX ADMIN — ENOXAPARIN SODIUM 40 MG: 40 INJECTION SUBCUTANEOUS at 08:15

## 2023-09-04 RX ADMIN — INSULIN LISPRO 5 UNITS: 100 INJECTION, SOLUTION INTRAVENOUS; SUBCUTANEOUS at 16:37

## 2023-09-04 RX ADMIN — INSULIN GLARGINE 18 UNITS: 100 INJECTION, SOLUTION SUBCUTANEOUS at 23:20

## 2023-09-04 RX ADMIN — INSULIN LISPRO 1 UNITS: 100 INJECTION, SOLUTION INTRAVENOUS; SUBCUTANEOUS at 13:09

## 2023-09-04 NOTE — CASE MANAGEMENT
Case Management Assessment & Discharge Planning Note    Patient name Colette Gramajo  Location / MRN [id number]  : 1965 Date 2023       Current Admission Date: 2023  Current Admission Diagnosis:DKA (diabetic ketoacidosis) ([address])   Patient Active Problem List    Diagnosis Date Noted   • Leukocytosis 2023   • JUAN (acute kidney injury) ([address]) 2023   • DKA (diabetic ketoacidosis) ([address]) 2023      LOS (days): 3  Geometric Mean LOS (GMLOS) (days):   Days to GMLOS:     OBJECTIVE:    Risk of Unplanned Readmission Score: 8.57         Current admission status: Inpatient  Referral Reason: Other (disposition planning)    Preferred Pharmacy:   [facility] [id number] [phone]  Phone: [phone] Fax: [phone]    Primary Care Provider: No primary care provider on file. Primary Insurance: BLUE CROSS  Secondary Insurance:     ASSESSMENT:  Active Health Care Proxies    There are no active Health Care Proxies on file. Readmission Root Cause  30 Day Readmission: No    Patient Information  Admitted from[de-identified] Home  Mental Status: Alert  During Assessment patient was accompanied by: Spouse  Assessment information provided by[de-identified] Patient, Spouse  Primary Caregiver: Self  Support Systems: Self, Spouse/significant other, [address] of Residence: [address] [place] [address] do you live in?: [address] entry access options.  Select all that apply.: No steps to enter home  Type of Current Residence: 2 story home  Upon entering residence, is there a bedroom on the main floor (no further steps)?: No  A bedroom is located on the following floor levels of residence (select all that apply):: 2nd Floor  Upon entering residence, is there a bathroom on the main floor (no further steps)?: No  Indicate which floors of current residence have a bathroom (select all the apply):: 2nd Floor  Number of steps to 2nd floor from main floor: One Flight  In the last 12 months, was there a time when you were not able to pay the mortgage or rent on time?: No  In the last 12 months, how many places have you lived?: 1  In the last 12 months, was there a time when you did not have a steady place to sleep or slept in a shelter (including now)?: No  Homeless/housing insecurity resource given?: N/A  Living Arrangements: Lives w/ Spouse/significant other, Lives w/ Daughter  Is patient a ?: No    Activities of Daily Living Prior to Admission  Functional Status: Independent  Completes ADLs independently?: Yes  Ambulates independently?: Yes  Does patient use assisted devices?: No  Does patient currently own DME?: No  Does patient have a history of Outpatient Therapy (PT/OT)?: No  Does the patient have a history of Short-Term Rehab?: No  Does patient have a history of HHC?: No  Does patient currently have [address]?: No         Patient Information Continued  Income Source: Employed  Does patient have prescription coverage?: Yes  Within the past 12 months, you worried that your food would run out before you got the money to buy more.: Never true  Within the past 12 months, the food you bought just didn't last and you didn't have money to get more.: Never true  Food insecurity resource given?: N/A  Does patient receive dialysis treatments?: No  Does patient have a history of substance abuse?: No  Does patient have a history of Mental Health Diagnosis?: No    PHQ 2/9 Screening   Reviewed PHQ 2/9 Depression Screening Score?: No    Means of Transportation  Means of Transport to Appts[de-identified] Drives Self  In the past 12 months, has lack of transportation kept you from medical appointments or from getting medications?: No  In the past 12 months, has lack of transportation kept you from meetings, work, or from getting things needed for daily living?: No  Was application for public transport provided?: N/A        DISCHARGE DETAILS:    Discharge planning discussed with[de-identified] Patient and spouse Zabrina Escalante of Choice: Yes  Comments - Freedom of Choice: Introduced self and role. Discussed patien't level of function prior to hospital admission. Discussed any needs at home. Patient states independent and will not need assistance upon discharge  CM contacted family/caregiver?: Yes  Were Treatment Team discharge recommendations reviewed with patient/caregiver?: Yes  Did patient/caregiver verbalize understanding of patient care needs?: Yes  Were patient/caregiver advised of the risks associated with not following Treatment Team discharge recommendations?: Yes    Contacts  Patient Contacts: Lovenia Sandhoff (wife) [phone]  Relationship to Patient[de-identified] Family  Contact Method:  In Person  Reason/Outcome: Continuity of Care, Emergency Contact, Discharge [address]         Is the patient interested in [facility] at discharge?: No    DME Referral Provided  Referral made for DME?: No    Other Referral/Resources/Interventions Provided:  Interventions: None Indicated  Referral Comments: patient declined CM needs    Would you like to participate in our [facility] [employer] service program?  : No - Declined    Treatment Team Recommendation: Home  Discharge Destination Plan[de-identified] Home  Transport at Discharge : Self

## 2023-09-04 NOTE — ASSESSMENT & PLAN NOTE
Present on admission 11.64, likely reactive d/t DKA / dehydration   Now resolved. Patient remained stable off antibiotics.

## 2023-09-04 NOTE — ASSESSMENT & PLAN NOTE
Present on admission Cr 2.06 due to DKA/Dehydration     S/p IV fluids ; cr improved down to 0.79 , baseline   Continue to monitor, avoid nephrotoxic agents   In and out , daily BMP

## 2023-09-04 NOTE — ASSESSMENT & PLAN NOTE
No results found for: "HGBA1C"    Recent Labs     09/03/23  1601 09/03/23  2106 09/04/23  0607 09/04/23  1122   POCGLU 356* 304* 112 199*       Blood Sugar Average: Last 72 hrs:  (P) [id number]     Anion Gap on Admission 29 ; Beta Hydroxy butyrate elevated 6.9  Endocrinology consult and following, input appreciated, suspecting MICHAEL vs Type 1   DM workup in process including Antibodies, C-Peptide and A1c  S/p Insulin drip, IV fluids, electrolyte repletions per protocol ; today 09/03 gap closed  tolerating diet and switched per Endo to Basal and prandial insulin, 18 Glargine + 5 tid  Educated patient at length regarding diabetes management with insulin as well as symptoms of hypoglycemia and treatment. Wife present at the bedside at the time of the above discussion. Tentative plan for discharge in 24 to 48 hours.

## 2023-09-04 NOTE — PLAN OF CARE

## 2023-09-04 NOTE — PLAN OF CARE
Problem: Potential for Falls  Goal: Patient will remain free of falls  Description: INTERVENTIONS:  - Educate patient/family on patient safety including physical limitations  - Instruct patient to call for assistance with activity   - Consult OT/PT to assist with strengthening/mobility   - Keep Call bell within reach  - Keep bed low and locked with side rails adjusted as appropriate  - Keep care items and personal belongings within reach  - Initiate and maintain comfort rounds  - Make Fall Risk Sign visible to staff  - Offer Toileting every 2 Hours, in advance of need  - Initiate/Maintain bed alarm  - Obtain necessary fall risk management equipment  - Apply yellow socks and bracelet for high fall risk patients  - Consider moving patient to room near nurses station  Outcome: Progressing     Problem: PAIN - ADULT  Goal: Verbalizes/displays adequate comfort level or baseline comfort level  Description: Interventions:  - Encourage patient to monitor pain and request assistance  - Assess pain using appropriate pain scale  - Administer analgesics based on type and severity of pain and evaluate response  - Implement non-pharmacological measures as appropriate and evaluate response  - Consider cultural and social influences on pain and pain management  - Notify physician/advanced practitioner if interventions unsuccessful or patient reports new pain  Outcome: Progressing     Problem: INFECTION - ADULT  Goal: Absence or prevention of progression during hospitalization  Description: INTERVENTIONS:  - Assess and monitor for signs and symptoms of infection  - Monitor lab/diagnostic results  - Monitor all insertion sites, i.e. indwelling lines, tubes, and drains  - Monitor endotracheal if appropriate and nasal secretions for changes in amount and color  - [place] appropriate cooling/warming therapies per order  - Administer medications as ordered  - Instruct and encourage patient and family to use good hand hygiene technique  - Identify and instruct in appropriate isolation precautions for identified infection/condition  Outcome: Progressing  Goal: Absence of fever/infection during neutropenic period  Description: INTERVENTIONS:  - Monitor WBC    Outcome: Progressing     Problem: SAFETY ADULT  Goal: Patient will remain free of falls  Description: INTERVENTIONS:  - Educate patient/family on patient safety including physical limitations  - Instruct patient to call for assistance with activity   - Consult OT/PT to assist with strengthening/mobility   - Keep Call bell within reach  - Keep bed low and locked with side rails adjusted as appropriate  - Keep care items and personal belongings within reach  - Initiate and maintain comfort rounds  - Make Fall Risk Sign visible to staff  - Offer Toileting every 2 Hours, in advance of need  - Initiate/Maintain bed alarm  - Obtain necessary fall risk management equipment  - Apply yellow socks and bracelet for high fall risk patients  - Consider moving patient to room near nurses station  Outcome: Progressing  Goal: Maintain or return to baseline ADL function  Description: INTERVENTIONS:  -  Assess patient's ability to carry out ADLs; assess patient's baseline for ADL function and identify physical deficits which impact ability to perform ADLs (bathing, care of mouth/teeth, toileting, grooming, dressing, etc.)  - Assess/evaluate cause of self-care deficits   - Assess range of motion  - Assess patient's mobility; develop plan if impaired  - Assess patient's need for assistive devices and provide as appropriate  - Encourage maximum independence but intervene and supervise when necessary  - Involve family in performance of ADLs  - Assess for home care needs following discharge   - Consider OT consult to assist with ADL evaluation and planning for discharge  - Provide patient education as appropriate  Outcome: Progressing  Goal: Maintains/Returns to pre admission functional level  Description: INTERVENTIONS:  - Perform BMAT or MOVE assessment daily.   - Set and communicate daily mobility goal to care team and patient/family/caregiver. - Collaborate with rehabilitation services on mobility goals if consulted  - Perform Range of Motion 4 times a day. - Reposition patient every 2 hours. - Dangle patient 3 times a day  - Stand patient 3 times a day  - Ambulate patient 3 times a day  - Out of bed to chair 3 times a day   - Out of bed for meals 3 times a day  - Out of bed for toileting  - Record patient progress and toleration of activity level   Outcome: Progressing     Problem: DISCHARGE PLANNING  Goal: Discharge to home or other facility with appropriate resources  Description: INTERVENTIONS:  - Identify barriers to discharge w/patient and caregiver  - Arrange for needed discharge resources and transportation as appropriate  - Identify discharge learning needs (meds, wound care, etc.)  - Arrange for interpretive services to assist at discharge as needed  - Refer to Case Management Department for coordinating discharge planning if the patient needs post-hospital services based on physician/advanced practitioner order or complex needs related to functional status, cognitive ability, or social support system  Outcome: Progressing     Problem: Knowledge Deficit  Goal: Patient/family/caregiver demonstrates understanding of disease process, treatment plan, medications, and discharge instructions  Description: Complete learning assessment and assess knowledge base. Interventions:  - Provide teaching at level of understanding  - Provide teaching via preferred learning methods  Outcome: Progressing     Problem: Nutrition/Hydration-ADULT  Goal: Nutrient/Hydration intake appropriate for improving, restoring or maintaining nutritional needs  Description: Monitor and assess patient's nutrition/hydration status for malnutrition. Collaborate with interdisciplinary team and initiate plan and interventions as ordered. Monitor patient's weight and dietary intake as ordered or per policy. Utilize nutrition screening tool and intervene as necessary. Determine patient's food preferences and provide high-protein, high-caloric foods as appropriate.      INTERVENTIONS:  - Monitor oral intake, urinary output, labs, and treatment plans  - Assess nutrition and hydration status and recommend course of action  - Evaluate amount of meals eaten  - Assist patient with eating if necessary   - Allow adequate time for meals  - Recommend/ encourage appropriate diets, oral nutritional supplements, and vitamin/mineral supplements  - Order, calculate, and assess calorie counts as needed  - Recommend, monitor, and adjust tube feedings and TPN/PPN based on assessed needs  - Assess need for intravenous fluids  - Provide specific nutrition/hydration education as appropriate  - Include patient/family/caregiver in decisions related to nutrition  Outcome: Progressing

## 2023-09-04 NOTE — ASSESSMENT & PLAN NOTE
No results found for: "HGBA1C"    Recent Labs     09/03/23  0401 09/03/23  0846 09/03/23  1337 09/03/23  1601   POCGLU 248* 170* 362* 356*       Blood Sugar Average: Last 72 hrs:  (P) [id number]     Anion Gap on Admission 29 ; Beta Hydroxy butyrate elevated 6.9    Endocrinology consult and following, input appreciated, suspecting MICHAEL vs Type 1     DM workup in process including Antibodies, C-Peptide and A1c    S/p Insulin drip, IV fluids, electrolyte repletions per protocol ; today 09/03 gap closed, tolerating diet and switched per Endo to Basal and prandial insulin, 18 Glargine + 5 tid

## 2023-09-04 NOTE — PROGRESS NOTES
[address]  Progress Note  Name: Magnolia Garcia  MRN: [id number]  Unit/Bed#:  I Date of Admission: 9/1/2023   Date of Service: 9/4/2023 I Hospital Day: 3    Assessment/Plan   * DKA (diabetic ketoacidosis) [facility])  Assessment & Plan  No results found for: "HGBA1C"    Recent Labs     09/03/23  1601 09/03/23  2106 09/04/23  0607 09/04/23  1122   POCGLU 356* 304* 112 199*       Blood Sugar Average: Last 72 hrs:  (P) 197.847571029023078     Anion Gap on Admission 29 ; Beta Hydroxy butyrate elevated 6.9  Endocrinology consult and following, input appreciated, suspecting MICHAEL vs Type 1   DM workup in process including Antibodies, C-Peptide and A1c  S/p Insulin drip, IV fluids, electrolyte repletions per protocol ; today 09/03 gap closed  tolerating diet and switched per Endo to Basal and prandial insulin, 18 Glargine + 5 tid  Educated patient at length regarding diabetes management with insulin as well as symptoms of hypoglycemia and treatment. Wife present at the bedside at the time of the above discussion. Tentative plan for discharge in 24 to 48 hours. Hyperlipidemia  Assessment & Plan  Noted with elevated total cholesterol, triglycerides, LDL.   start on Lipitor 10 mg nightly. JUAN (acute kidney injury) [facility])  Assessment & Plan  Present on admission Cr 2.06 due to DKA/Dehydration   Now resolved. Leukocytosis  Assessment & Plan  Present on admission 11.64, likely reactive d/t DKA / dehydration   Now resolved. Patient remained stable off antibiotics. VTE Pharmacologic Prophylaxis:   Moderate Risk (Score 3-4) - Pharmacological DVT Prophylaxis Ordered: enoxaparin (Lovenox). Patient Centered Rounds: I performed bedside rounds with nursing staff today. Education and Discussions with Family / Patient: Updated  (wife) at bedside.     Current Length of Stay: 3 day(s)  Current Patient Status: Inpatient   Certification Statement: The patient will continue to require additional inpatient hospital stay due to [facility] / DM education. awaiting further input from endocrinology. Discharge Plan: Anticipate discharge in 24-48 hrs to home. Code Status: Level 1 - Full Code    Subjective:   Seen during AM rounds. Appears comfortable nondistressed. He is in good spirit. Currently patient does report feeling well and eager to go home. Denies chest pain, dyspnea, fever, chills, nausea, vomiting, any other new comments. No other events reported. Discussed with nursing staff to start educating regarding insulin management. Objective:     Vitals:   Temp (24hrs), Av.2 °F (36.8 °C), Min:98 °F (36.7 °C), Max:98.6 °F (37 °C)    Temp:  [98 °F (36.7 °C)-98.6 °F (37 °C)] 98.1 °F (36.7 °C)  HR:  [61-75] 63  Resp:  [13-22] 16  BP: (113-130)/(65-83) 130/83  SpO2:  [96 %-100 %] 96 %  Body mass index is 27.33 kg/m². Input and Output Summary (last 24 hours): Intake/Output Summary (Last 24 hours) at 2023 1241  Last data filed at 9/3/2023 2334  Gross per 24 hour   Intake 250 ml   Output 650 ml   Net -400 ml       Physical Exam:   Physical Exam  Constitutional:       General: He is not in acute distress. Appearance: Normal appearance. He is not ill-appearing, toxic-appearing or diaphoretic. HENT:      Head: Normocephalic and atraumatic. Eyes:      Pupils: Pupils are equal, round, and reactive to light. Cardiovascular:      Rate and Rhythm: Normal rate. Pulses: Normal pulses. Pulmonary:      Effort: Pulmonary effort is normal. No respiratory distress. Breath sounds: Normal breath sounds. No wheezing. Abdominal:      General: Bowel sounds are normal. There is no distension. Palpations: Abdomen is soft. Tenderness: There is no abdominal tenderness. Musculoskeletal:      Cervical back: Normal range of motion. No rigidity. Neurological:      Mental Status: He is alert and oriented to person, place, and time.    Psychiatric:         Mood and Affect: Mood normal.         Behavior: Behavior normal.          Additional Data:     Labs:  Results from last 7 days   Lab Units 23  0522   WBC Thousand/uL 5.52   HEMOGLOBIN g/dL 12.2   HEMATOCRIT % 39.5   PLATELETS Thousands/uL 144*   NEUTROS PCT % 55   LYMPHS PCT % 39   MONOS PCT % 5   EOS PCT % 1     Results from last 7 days   Lab Units 23  0522   SODIUM mmol/L 141   POTASSIUM mmol/L 4.1   CHLORIDE mmol/L 112*   CO2 mmol/L 26   BUN mg/dL 18   CREATININE mg/dL 0.84   ANION GAP mmol/L 3   CALCIUM mg/dL 8.0*   ALBUMIN g/dL 2.9*   TOTAL BILIRUBIN mg/dL 0.43   ALK PHOS U/L 73   ALT U/L 22   AST U/L 27   GLUCOSE RANDOM mg/dL 112         Results from last 7 days   Lab Units 23  1122 23  0607 23  2106 23  1601 23  1337 23  0846 23  0401 23  0213 23  0003 23  2159 23  2007 23  1756   POC GLUCOSE mg/dl 199* 112 304* 356* 362* 170* 248* 249* 233* 92 150* 191*         Results from last 7 days   Lab Units 23   PROCALCITONIN ng/ml 0.21       Lines/Drains:  Invasive Devices     Peripheral Intravenous Line  Duration           Peripheral IV 23 Left Antecubital 2 days    Peripheral IV 23 Right Antecubital 2 days                  Telemetry:  Telemetry Orders (From admission, onward)             24 Hour Telemetry Monitoring  Continuous x 24 Hours (Telem)           Question:  Reason for 24 Hour Telemetry  Answer:  Metabolic/electrolyte disturbance with high probability of dysrhythmia. K level <3 or >6 OR KCL infusion >10mEq/hr                 Telemetry Reviewed: Normal Sinus Rhythm  Indication for Continued Telemetry Use: No indication for continued use. Will discontinue.            Recent Cultures (last 7 days):         Last 24 Hours Medication List:   Current Facility-Administered Medications   Medication Dose Route Frequency Provider Last Rate   • acetaminophen  650 mg Oral Q6H PRN Nicole Im, SUZANNE     • atorvastatin  10 mg Oral Daily With Dinner Stacy Heredia MD     • chlorhexidine  15 mL Mouth/Throat Q12H [address] SUZANNE Capone     • enoxaparin  40 mg Subcutaneous Daily Destiny Byrnes     • insulin glargine  18 Units Subcutaneous HS Heidy Mckeon PA-C     • insulin lispro  1-5 Units Subcutaneous 4x Daily (PC & HS) Elisabet Marroquin PA-C     • insulin lispro  5 Units Subcutaneous TID With Meals Heidy Mckeon PA-C     • ondansetron  4 mg Intravenous Q6H PRN Heidy Mckeon PA-C     • sodium chloride (PF)  3 mL Intravenous Q1H PRN Heidy Mckeon PA-C          Today, Patient Was Seen By: Stacy Heredia MD    **Please Note: This note may have been constructed using a voice recognition system. **

## 2023-09-04 NOTE — PROGRESS NOTES
[address]  Progress Note  Name: Charanjit Archibald  MRN: [id number]  Unit/Bed#:  I Date of Admission: 9/1/2023   Date of Service: 9/3/2023 I Hospital Day: 2    Assessment/Plan   * DKA (diabetic ketoacidosis) ([address] [facility])  Assessment & Plan  No results found for: "HGBA1C"    Recent Labs     09/03/23  0401 09/03/23  0846 09/03/23  1337 09/03/23  1601   POCGLU 248* 170* 362* 356*       Blood Sugar Average: Last 72 hrs:  (P) 197.7581774664597633     Anion Gap on Admission 29 ; Beta Hydroxy butyrate elevated 6.9    Endocrinology consult and following, input appreciated, suspecting MICHAEL vs Type 1     DM workup in process including Antibodies, C-Peptide and A1c    S/p Insulin drip, IV fluids, electrolyte repletions per protocol ; today 09/03 gap closed, tolerating diet and switched per Endo to Basal and prandial insulin, 18 Glargine + 5 tid    JUAN (acute kidney injury) ([address] [facility])  Assessment & Plan  Present on admission Cr 2.06 due to DKA/Dehydration     S/p IV fluids ; cr improved down to 0.79 , baseline   Continue to monitor, avoid nephrotoxic agents   In and out , daily BMP     Leukocytosis  Assessment & Plan  Present on admission 11.64, likely reactive d/t DKA / dehydration     Afebrile , no signs of infections     Trend down to WNL     Metabolic acidosis-resolved as of 9/2/2023  Assessment & Plan  Due to DKA s/p insulin drip and fluids per protocol, resolved. Pseudohyponatremia-resolved as of 9/2/2023  Assessment & Plan  Due to hyperglycemia, see DKA  09/03 Na is 140              VTE Pharmacologic Prophylaxis:   Moderate Risk (Score 3-4) - Pharmacological DVT Prophylaxis Ordered: enoxaparin (Lovenox). Patient Centered Rounds: I performed bedside rounds with nursing staff today. Discussions with Specialists or Other Care Team Provider: endo A&P  noted    Education and Discussions with Family / Patient: Patient declined call to .      Total Time Spent on Date of Encounter in care of patient: 45 minutes This time was spent on one or more of the following: performing physical exam; counseling and coordination of care; obtaining or reviewing history; documenting in the medical record; reviewing/ordering tests, medications or procedures; communicating with other healthcare professionals and discussing with patient's family/caregivers. Current Length of Stay: 2 day(s)  Current Patient Status: Inpatient   Certification Statement: The patient will continue to require additional inpatient hospital stay due to newly diagnosed diabetes, DKA on admission , management in place, ENdo following   Discharge Plan: Anticipate discharge in 24-48 hrs to home.     Code Status: Level 1 - Full Code    Subjective:   Anthony Lakhani is seen and examined  Today tolerating PO , low carb diet, transitioned per endo as above to basal + prandial insulin , off insulin drip   Patient reports drinking "glucose bags mixed with water" which is provided to him by employer / ups , and that he use it during hot weather , "not new thing" per patient   Counseled on the new diagnosis , diabetes and the DKA presentation as above ; and to adhere with low carb diet and diabetes management as to be determined on discharge , he understand and agree   Denies any nausea or vomiting, denies any pain or SOB    Blood work including AC , C-peptide and Insulin Abx still in process, will continue to monitor and revaluate , endo following input appreciated   25hydroxy vitamin D and Lipid panel ordered today, to follow results as well  Offered to call family for update but patient declined noting his family will come later to visit and he will update them   Discussed with RN at bedside , and will continue to follow accordingly     Objective:     Vitals:   Temp (24hrs), Av °F (36.7 °C), Min:97.9 °F (36.6 °C), Max:98.1 °F (36.7 °C)    Temp:  [97.9 °F (36.6 °C)-98.1 °F (36.7 °C)] 98.1 °F (36.7 °C)  HR:  [58-75] 75  Resp:  [13-22] 18  BP: (117-140)/(65-76) 128/65  SpO2:  [99 %-100 %] 99 %  Body mass index is 27.79 kg/m². Input and Output Summary (last 24 hours): Intake/Output Summary (Last 24 hours) at 9/3/2023 2016  Last data filed at 9/3/2023 2000  Gross per 24 hour   Intake 738.77 ml   Output 675 ml   Net 63.77 ml       Physical Exam:  - GEN: Appears well, alert and oriented x 3, pleasant and cooperative, in no acute distress  - HEENT: Anicteric, mucous membranes moist, PERRL and EOMI   - NECK: No lymphadenopathy, JVD or carotid bruits   - HEART: RRR, normal S1 and S2, no murmurs, clicks, gallops or rubs   - LUNGS: Clear to auscultation bilaterally; no wheezes, rales, or rhonchi  - ABDOMEN: Normal bowel sounds, soft, no tenderness, no distention, no organomegaly or masses felt on exam.   - EXTREMITIES: Peripheral pulses normal; no clubbing, cyanosis, or edema  - NEURO: No focal findings, CN II-XII are grossly intact. - Musculoskeletal: 5/5 strength, normal ROM, no swollen or erythematous joints.    - SKIN: Normal without suspicious lesions on exposed skin      Additional Data:     Labs:  Results from last 7 days   Lab Units 09/03/23  0449   WBC Thousand/uL 7.04   HEMOGLOBIN g/dL 12.7   HEMATOCRIT % 40.5   PLATELETS Thousands/uL 151   NEUTROS PCT % 63   LYMPHS PCT % 32   MONOS PCT % 5   EOS PCT % 0     Results from last 7 days   Lab Units 09/03/23  0449   SODIUM mmol/L 140   POTASSIUM mmol/L 4.5   CHLORIDE mmol/L 113*   CO2 mmol/L 23   BUN mg/dL 20   CREATININE mg/dL 0.79   ANION GAP mmol/L 4   CALCIUM mg/dL 7.7*   ALBUMIN g/dL 2.8*   TOTAL BILIRUBIN mg/dL 0.49   ALK PHOS U/L 68   ALT U/L 18   AST U/L 35   GLUCOSE RANDOM mg/dL 230*         Results from last 7 days   Lab Units 09/03/23  1601 09/03/23  1337 09/03/23  0846 09/03/23  0401 09/03/23  0213 09/03/23  0003 09/02/23  2159 09/02/23  2007 09/02/23  1756 09/02/23  1617 09/02/23  1416 09/02/23  1209   POC GLUCOSE mg/dl 356* 362* 170* 248* 249* 233* 92 150* 191* 220* 301* 189* Results from last 7 days   Lab Units 23   PROCALCITONIN ng/ml 0.21       Lines/Drains:  Invasive Devices     Peripheral Intravenous Line  Duration           Peripheral IV 23 Left Antecubital 2 days    Peripheral IV 23 Right Antecubital 2 days                  Telemetry:  Telemetry Orders (From admission, onward)             24 Hour Telemetry Monitoring  Continuous x 24 Hours (Telem)           Question:  Reason for 24 Hour Telemetry  Answer:  Metabolic/electrolyte disturbance with high probability of dysrhythmia. K level <3 or >6 OR KCL infusion >10mEq/hr                 Telemetry Reviewed: Normal Sinus Rhythm  Indication for Continued Telemetry Use: Metabolic/electrolyte disturbance with high probability of dysrhythmia             Imaging: No pertinent imaging reviewed. Recent Cultures (last 7 days):         Last 24 Hours Medication List:   Current Facility-Administered Medications   Medication Dose Route Frequency Provider Last Rate   • acetaminophen  650 mg Oral Q6H PRN Lashonda Martínez PA-C     • chlorhexidine  15 mL Mouth/Throat Q12H [facility] Lashonda Martínez PA-C     • enoxaparin  40 mg Subcutaneous Daily Lashonda Martínez PA-C     • insulin glargine  18 Units Subcutaneous HS Lashonda Martínez PA-C     • insulin lispro  1-5 Units Subcutaneous 4x Daily (PC & HS) Magui Conley PA-C     • insulin lispro  5 Units Subcutaneous TID With Meals Lashonda Martínez PA-C     • ondansetron  4 mg Intravenous Q6H PRN Lashonda Martínez PA-C     • sodium chloride (PF)  3 mL Intravenous Q1H PRN Lashonda Martínez PA-C          Today, Patient Was Seen By: Robert Rodriguez DO    **Please Note: This note may have been constructed using a voice recognition system. **

## 2023-09-04 NOTE — ASSESSMENT & PLAN NOTE
Present on admission 11.64, likely reactive d/t DKA / dehydration     Afebrile , no signs of infections     Trend down to [facility]

## 2023-09-05 VITALS
SYSTOLIC BLOOD PRESSURE: 137 MMHG | WEIGHT: 169.31 LBS | BODY MASS INDEX: 27.21 KG/M2 | HEART RATE: 64 BPM | DIASTOLIC BLOOD PRESSURE: 68 MMHG | OXYGEN SATURATION: 99 % | RESPIRATION RATE: 17 BRPM | TEMPERATURE: 98.1 F | HEIGHT: 66 IN

## 2023-09-05 LAB
GAD65 AB SER-ACNC: <5 U/ML (ref 0–5)
GLUCOSE SERPL-MCNC: 169 MG/DL (ref 65–140)
GLUCOSE SERPL-MCNC: 97 MG/DL (ref 65–140)
PANC ISLET CELL AB TITR SER: NEGATIVE {TITER}

## 2023-09-05 PROCEDURE — 82948 REAGENT STRIP/BLOOD GLUCOSE: CPT

## 2023-09-05 PROCEDURE — 99239 HOSP IP/OBS DSCHRG MGMT >30: CPT | Performed by: STUDENT IN AN ORGANIZED HEALTH CARE EDUCATION/TRAINING PROGRAM

## 2023-09-05 RX ORDER — INSULIN LISPRO 100 [IU]/ML
1-5 INJECTION, SOLUTION INTRAVENOUS; SUBCUTANEOUS
Status: DISCONTINUED | OUTPATIENT
Start: 2023-09-05 | End: 2023-09-05 | Stop reason: HOSPADM

## 2023-09-05 RX ORDER — GLUCOSAMINE HCL/CHONDROITIN SU 500-400 MG
CAPSULE ORAL
Qty: 100 EACH | Refills: 0 | Status: SHIPPED | OUTPATIENT
Start: 2023-09-05

## 2023-09-05 RX ORDER — INSULIN GLARGINE 100 [IU]/ML
16 INJECTION, SOLUTION SUBCUTANEOUS
Qty: 6 ML | Refills: 0 | Status: SHIPPED | OUTPATIENT
Start: 2023-09-05

## 2023-09-05 RX ORDER — INSULIN GLARGINE 100 [IU]/ML
18 INJECTION, SOLUTION SUBCUTANEOUS
Qty: 6 ML | Refills: 0 | Status: SHIPPED | OUTPATIENT
Start: 2023-09-05 | End: 2023-09-05 | Stop reason: SDUPTHER

## 2023-09-05 RX ORDER — ATORVASTATIN CALCIUM 10 MG/1
10 TABLET, FILM COATED ORAL
Qty: 30 TABLET | Refills: 0 | Status: SHIPPED | OUTPATIENT
Start: 2023-09-05

## 2023-09-05 RX ORDER — BLOOD-GLUCOSE METER
KIT MISCELLANEOUS
Qty: 1 KIT | Refills: 0 | Status: SHIPPED | OUTPATIENT
Start: 2023-09-05

## 2023-09-05 RX ORDER — INSULIN LISPRO 100 [IU]/ML
7 INJECTION, SOLUTION INTRAVENOUS; SUBCUTANEOUS
Status: DISCONTINUED | OUTPATIENT
Start: 2023-09-05 | End: 2023-09-05 | Stop reason: HOSPADM

## 2023-09-05 RX ORDER — INSULIN GLARGINE 100 [IU]/ML
16 INJECTION, SOLUTION SUBCUTANEOUS
Status: DISCONTINUED | OUTPATIENT
Start: 2023-09-05 | End: 2023-09-05 | Stop reason: HOSPADM

## 2023-09-05 RX ORDER — INSULIN LISPRO 200 [IU]/ML
7 INJECTION, SOLUTION SUBCUTANEOUS
Qty: 3 ML | Refills: 0 | Status: SHIPPED | OUTPATIENT
Start: 2023-09-05

## 2023-09-05 RX ORDER — ERGOCALCIFEROL 1.25 MG/1
50000 CAPSULE ORAL WEEKLY
Qty: 4 CAPSULE | Refills: 0 | Status: SHIPPED | OUTPATIENT
Start: 2023-09-05

## 2023-09-05 RX ORDER — INSULIN LISPRO 200 [IU]/ML
5 INJECTION, SOLUTION SUBCUTANEOUS
Qty: 3 ML | Refills: 0 | Status: SHIPPED | OUTPATIENT
Start: 2023-09-05 | End: 2023-09-05

## 2023-09-05 RX ORDER — BLOOD SUGAR DIAGNOSTIC
STRIP MISCELLANEOUS
Qty: 100 EACH | Refills: 0 | Status: SHIPPED | OUTPATIENT
Start: 2023-09-05

## 2023-09-05 RX ORDER — LANCETS 33 GAUGE
EACH MISCELLANEOUS
Qty: 100 EACH | Refills: 0 | Status: SHIPPED | OUTPATIENT
Start: 2023-09-05

## 2023-09-05 RX ADMIN — INSULIN LISPRO 1 UNITS: 100 INJECTION, SOLUTION INTRAVENOUS; SUBCUTANEOUS at 11:57

## 2023-09-05 RX ADMIN — INSULIN LISPRO 7 UNITS: 100 INJECTION, SOLUTION INTRAVENOUS; SUBCUTANEOUS at 11:56

## 2023-09-05 RX ADMIN — INSULIN LISPRO 5 UNITS: 100 INJECTION, SOLUTION INTRAVENOUS; SUBCUTANEOUS at 08:20

## 2023-09-05 RX ADMIN — CHLORHEXIDINE GLUCONATE 0.12% ORAL RINSE 15 ML: 1.2 LIQUID ORAL at 08:20

## 2023-09-05 RX ADMIN — ENOXAPARIN SODIUM 40 MG: 40 INJECTION SUBCUTANEOUS at 08:20

## 2023-09-05 NOTE — DISCHARGE INSTR - AVS FIRST PAGE
Recommend close follow-up with primary care provider within 3 to 5 days of discharge. Recommend outpatient follow-up with endocrinology.

## 2023-09-05 NOTE — PLAN OF CARE
Problem: Potential for Falls  Goal: Patient will remain free of falls  Description: INTERVENTIONS:  - Educate patient/family on patient safety including physical limitations  - Instruct patient to call for assistance with activity   - Consult OT/PT to assist with strengthening/mobility   - Keep Call bell within reach  - Keep bed low and locked with side rails adjusted as appropriate  - Keep care items and personal belongings within reach  - Initiate and maintain comfort rounds  - Make Fall Risk Sign visible to staff  - Offer Toileting every 2 Hours, in advance of need  - Initiate/Maintain bed alarm  - Apply yellow socks and bracelet for high fall risk patients  - Consider moving patient to room near nurses station  Outcome: Progressing     Problem: PAIN - ADULT  Goal: Verbalizes/displays adequate comfort level or baseline comfort level  Description: Interventions:  - Encourage patient to monitor pain and request assistance  - Assess pain using appropriate pain scale  - Administer analgesics based on type and severity of pain and evaluate response  - Implement non-pharmacological measures as appropriate and evaluate response  - Consider cultural and social influences on pain and pain management  - Notify physician/advanced practitioner if interventions unsuccessful or patient reports new pain  Outcome: Progressing     Problem: INFECTION - ADULT  Goal: Absence or prevention of progression during hospitalization  Description: INTERVENTIONS:  - Assess and monitor for signs and symptoms of infection  - Monitor lab/diagnostic results  - Monitor all insertion sites, i.e. indwelling lines, tubes, and drains  - Monitor endotracheal if appropriate and nasal secretions for changes in amount and color  - Geraldine appropriate cooling/warming therapies per order  - Administer medications as ordered  - Instruct and encourage patient and family to use good hand hygiene technique  - Identify and instruct in appropriate isolation precautions for identified infection/condition  Outcome: Progressing     Problem: SAFETY ADULT  Goal: Patient will remain free of falls  Description: INTERVENTIONS:  - Educate patient/family on patient safety including physical limitations  - Instruct patient to call for assistance with activity   - Consult OT/PT to assist with strengthening/mobility   - Keep Call bell within reach  - Keep bed low and locked with side rails adjusted as appropriate  - Keep care items and personal belongings within reach  - Initiate and maintain comfort rounds  - Make Fall Risk Sign visible to staff  - Offer Toileting every 2 Hours, in advance of need  - Initiate/Maintain bed alarm  - Apply yellow socks and bracelet for high fall risk patients  - Consider moving patient to room near nurses station  Outcome: Progressing  Goal: Maintain or return to baseline ADL function  Description: INTERVENTIONS:  -  Assess patient's ability to carry out ADLs; assess patient's baseline for ADL function and identify physical deficits which impact ability to perform ADLs (bathing, care of mouth/teeth, toileting, grooming, dressing, etc.)  - Assess/evaluate cause of self-care deficits   - Assess range of motion  - Assess patient's mobility; develop plan if impaired  - Assess patient's need for assistive devices and provide as appropriate  - Encourage maximum independence but intervene and supervise when necessary  - Involve family in performance of ADLs  - Assess for home care needs following discharge   - Consider OT consult to assist with ADL evaluation and planning for discharge  - Provide patient education as appropriate  Outcome: Adequate for Discharge  Goal: Maintains/Returns to pre admission functional level  Description: INTERVENTIONS:  - Perform BMAT or MOVE assessment daily.   - Set and communicate daily mobility goal to care team and patient/family/caregiver.    - Collaborate with rehabilitation services on mobility goals if consulted  - Stand patient 4 times a day  - Ambulate patient 4 times a day  - Out of bed to chair 4 times a day   - Out of bed for meals 3 times a day  - Out of bed for toileting  - Record patient progress and toleration of activity level   Outcome: Adequate for Discharge     Problem: Nutrition/Hydration-ADULT  Goal: Nutrient/Hydration intake appropriate for improving, restoring or maintaining nutritional needs  Description: Monitor and assess patient's nutrition/hydration status for malnutrition. Collaborate with interdisciplinary team and initiate plan and interventions as ordered. Monitor patient's weight and dietary intake as ordered or per policy. Utilize nutrition screening tool and intervene as necessary. Determine patient's food preferences and provide high-protein, high-caloric foods as appropriate.      INTERVENTIONS:  - Monitor oral intake, urinary output, labs, and treatment plans  - Assess nutrition and hydration status and recommend course of action  - Evaluate amount of meals eaten  - Assist patient with eating if necessary   - Allow adequate time for meals  - Recommend/ encourage appropriate diets, oral nutritional supplements, and vitamin/mineral supplements  - Order, calculate, and assess calorie counts as needed  - Recommend, monitor, and adjust tube feedings and TPN/PPN based on assessed needs  - Assess need for intravenous fluids  - Provide specific nutrition/hydration education as appropriate  - Include patient/family/caregiver in decisions related to nutrition  Outcome: Progressing

## 2023-09-05 NOTE — TREATMENT PLAN
Chart, blood sugars reviewed. POC Glucose (mg/dl)   Date Value   09/05/2023 97   09/04/2023 216 (H)   09/04/2023 299 (H)   09/04/2023 199 (H)   09/04/2023 112   09/03/2023 304 (H)   09/03/2023 356 (H)   09/03/2023 362 (H)   09/03/2023 170 (H)   09/03/2023 248 (H)     1.  Newly diagnosed diabetes mellitus hyperglycemia  2. DKA-resolved  Recommend the following for now  -Decrease Lantus to 16 units subcutaneously at bedtime  -Increase Humalog to 7 units with meals 3 times a day  -Continue sliding scale insulin    3. vitamin D deficiency- -recommend starting vitamin D2 50,000 international units orally once a week for 8 weeks. Then start maintenance with vitamin D3 2000 international units orally daily which the patient can get over-the-counter.   Repeat vitamin-D level in 3 months    Follow up with endocrinology as an outpatient

## 2023-09-05 NOTE — ASSESSMENT & PLAN NOTE
No results found for: "HGBA1C"    Recent Labs     09/04/23  1535 09/04/23  2043 09/05/23  0731 09/05/23  1156   POCGLU 299* 216* 97 169*       Blood Sugar Average: Last 72 hrs:  (P) 792.8180423077395272     Anion Gap on Admission 29 ; Beta Hydroxy butyrate elevated 6.9  Endocrinology consult and following, input appreciated, suspecting MICHAEL vs Type 1   DM workup in process including Antibodies, C-Peptide and A1c  S/p Insulin drip, IV fluids, electrolyte repletions per protocol ; today 09/03 gap closed  tolerating diet and switched per Endo to Basal and prandial insulin, 18 Glargine + 5 tid  Educated patient at length regarding diabetes management with insulin as well as symptoms of hypoglycemia and treatment. Wife present at the bedside at the time of the above discussion. Tentative plan for discharge in 24 to 48 hours.

## 2023-09-05 NOTE — DISCHARGE SUMMARY
[address]  [facility] [place] 1965, 60 y.o. male MRN: [id number]  Unit/Bed#:  Encounter: [id number]  Primary Care Provider: No primary care provider on file. Date and time admitted to hospital: 9/1/2023  2:46 PM    * DKA (diabetic ketoacidosis) [facility])  Assessment & Plan  No results found for: "HGBA1C"    Recent Labs     09/04/23  1535 09/04/23  2043 09/05/23  0731 09/05/23  1156   POCGLU 299* 216* 97 169*       Blood Sugar Average: Last 72 hrs:  (P) [id number]     Anion Gap on Admission 29 ; Beta Hydroxy butyrate elevated 6.9  Endocrinology consult and following, input appreciated, suspecting MICHAEL vs Type 1   DM workup in process including glutamic acid decarboxylase, C-peptide, A1c pending. DKA has now resolved. Currently tolerating subcu insulin well. Tolerating diet well. Endocrine recommendation noted to decrease Lantus to 16 units nightly, increase Humalog to 7 units 3 times daily with meals. Price check for both insulin pen and costs $15 co-pay for each total $30 per month supply. Pt and wife are ok with above co-pay. Prescription for diabetic supplies sent to the pharmacy. Discussed  with patient at length and educated regarding hypoglycemic symptoms and treatment. Recommend close outpatient follow-up with PCP and endocrinology. Hyperlipidemia  Assessment & Plan  Noted with elevated total cholesterol, triglycerides, LDL.   start on Lipitor 10 mg nightly. JUAN (acute kidney injury) [facility])  Assessment & Plan  Present on admission Cr 2.06 due to DKA/Dehydration   Now resolved. Currently creatinine 0.84. Close outpatient follow-up with PCP. Leukocytosis  Assessment & Plan  Present on admission 11.64, likely reactive d/t DKA / dehydration   Now resolved. Currently WBC 5.52. Patient remained stable off antibiotics.     Vitamin D deficiency: discharge on vitamin D 50,000 units orally once weekly for 4 weeks with recommendation to follow-up with primary care provider on outpatient basis for refill Rx. Medical Problems     Resolved Problems  Date Reviewed: 9/5/2023          Resolved    Pseudohyponatremia 9/2/2023     Resolved by  Tala Agrawal PA-C    Metabolic acidosis 1/3/2387     Resolved by  Tala Agrawal PA-C        Discharging Physician / Practitioner: Gabbi Griffith MD  PCP: No primary care provider on file. Admission Date:   Admission Orders (From admission, onward)     Ordered        09/01/23 [address]  Once            09/01/23 1732  Inpatient Admission  Once                      Discharge Date: 09/05/23    Consultations During Hospital Stay:  · Endocrinology    Test Results Pending at Discharge (will require follow up):   C-peptide, A1c, glutamic acid decarboxylase    Reason for Admission: DKA, JUAN, new diagnosis of DM. Hospital Course:     Quinton Brennan is a 60 y.o. male patient with no significant reported past medical history, who originally presented to the hospital on 9/1/2023 due to nausea, poor appetite, abnormal lab work. Patient reports that his PCP had told him to come to the emergency room because glucose was high. On further invention he was noted with DKA, JUAN and was admitted under the service of care and subsequently downgraded to [facility]. DKA has now resolved. Some of the lab testing including A1c, C-peptide, glutamic acid decarboxylase pending. Patient was seen by endocrinology and transition to subcu insulin. Currently recommendation is to decrease insulin dose as able. Currently being discharged on Lantus 16 units nightly, Humalog 7 units 3 times daily with meals per endocrinology recommendation. Combine cost $30 per months as per Pharmacy and pt and wife are ok with co-pay. Prescription for diabetic supplies sent to the pharmacy. Educated patient and wife at length regarding diabetes management, hypoglycemic symptoms and treatment. JUAN has now resolved after hydration.   Leukocytosis resolved and patient remained stable off antibiotics. Currently hemodynamically stable for discharge. Recommend close outpatient follow-up with PCP, endocrinology. Patient and wife both are in agreement with above plan. No other events reported. Refer to earlier notes for further clarification. Please see above list of diagnoses and related plan for additional information. Condition at Discharge: good    Discharge Day Visit / Exam:   Subjective: Seen during AM rounds. Tolerating diet well. Insulin dose adjusted per Accu-Cheks and endocrine recommendation. Currently patient denies chest pain, dyspnea, fever, chills, nausea, vomiting, abdominal pain, any other new comments. Reports feeling well and eager to go home. No other events reported. Vitals: Blood Pressure: 137/68 (09/05/23 1141)  Pulse: 64 (09/05/23 1141)  Temperature: 98.1 °F (36.7 °C) (09/05/23 1141)  Temp Source: Oral (09/05/23 1141)  Respirations: 17 (09/05/23 1141)  Height: 5' 6" (167.6 cm) (09/01/23 1712)  Weight - Scale: 76.8 kg (169 lb 5 oz) (09/04/23 0534)  SpO2: 99 % (09/05/23 1141)  Exam:   Physical Exam  Constitutional:       General: He is not in acute distress. Appearance: Normal appearance. He is not ill-appearing, toxic-appearing or diaphoretic. HENT:      Head: Normocephalic and atraumatic. Eyes:      Pupils: Pupils are equal, round, and reactive to light. Cardiovascular:      Rate and Rhythm: Normal rate. Pulses: Normal pulses. Pulmonary:      Effort: Pulmonary effort is normal. No respiratory distress. Breath sounds: Normal breath sounds. No wheezing. Abdominal:      General: Bowel sounds are normal. There is no distension. Palpations: Abdomen is soft. Tenderness: There is no abdominal tenderness. Musculoskeletal:      Cervical back: Normal range of motion. No rigidity. Neurological:      Mental Status: He is alert and oriented to person, place, and time.    Psychiatric:         Mood and Affect: Mood normal.         Behavior: Behavior normal.          Discussion with Family: Updated  (wife) at bedside. Discharge instructions/Information to patient and family:   See after visit summary for information provided to patient and family. Provisions for Follow-Up Care:  See after visit summary for information related to follow-up care and any pertinent home health orders. Disposition:   Home    Planned Readmission:      Discharge Statement:  I spent 35 minutes discharging the patient. This time was spent on the day of discharge. I had direct contact with the patient on the day of discharge. Greater than 50% of the total time was spent examining patient, answering all patient questions, arranging and discussing plan of care with patient as well as directly providing post-discharge instructions. Additional time then spent on discharge activities. Discharge Medications:  See after visit summary for reconciled discharge medications provided to patient and/or family.       **Please Note: This note may have been constructed using a voice recognition system**

## 2023-09-06 LAB
C PEPTIDE SERPL-MCNC: 0.7 NG/ML (ref 1.1–4.4)
GAD65 AB SER-ACNC: <5 U/ML (ref 0–5)
PANC ISLET CELL AB TITR SER: NEGATIVE {TITER}

## 2023-09-06 NOTE — UTILIZATION REVIEW
NOTIFICATION OF ADMISSION DISCHARGE   This is a Notification of Discharge from [address] [place] [address]. Please be advised that this patient has been discharge from our facility. Below you will find the admission and discharge date and time including the patient’s disposition. UTILIZATION REVIEW CONTACT:  Madalyn Dominguez  Utilization   Network Utilization Review Department  Phone: [phone] x carefully listen to the prompts. All voicemails are confidential.  Email: [email or web address]     ADMISSION INFORMATION  PRESENTATION DATE: 9/1/2023  2:46 PM  OBERVATION ADMISSION DATE:   INPATIENT ADMISSION DATE: 9/1/23  5:24 PM   DISCHARGE DATE: 9/5/2023  3:46 PM   DISPOSITION:Home/Self Care    IMPORTANT INFORMATION:  Send all requests for admission clinical reviews, approved or denied determinations and any other requests to dedicated fax number below belonging to the campus where the patient is receiving treatment.  List of dedicated fax numbers:  [place] DENIALS (Administrative/Medical Necessity) [phone] [address] [facility] (Maternity/NICU/Pediatrics) [phone]   [facility] [phone]   [facility] [phone] [address] [facility] [phone]   [address] [place] [address] [phone]   [facility] [phone]   [address] [place] [address] [address] [facility] [phone]   [address] [facility] [phone] [address] [facility] [phone] [address] [facility] [address] [facility] [phone]

## 2023-09-08 LAB
EST. AVERAGE GLUCOSE BLD GHB EST-MCNC: >398 MG/DL
HBA1C MFR BLD: >15.5 %

## 2023-09-10 LAB
INSULIN AB SER-ACNC: <5 UU/ML
INSULIN AB SER-ACNC: <5 UU/ML

## 2023-09-11 LAB
EST. AVERAGE GLUCOSE BLD GHB EST-MCNC: >398 MG/DL
HBA1C MFR BLD: >15.5 %

## 2023-09-14 LAB — ISLET CELL512 AB SER-ACNC: <7.5 U/ML

## 2023-11-04 PROBLEM — E11.10 DKA (DIABETIC KETOACIDOSIS) (HCC): Status: RESOLVED | Noted: 2023-09-01 | Resolved: 2023-11-04

## 2023-12-30 ENCOUNTER — OFFICE VISIT (OUTPATIENT)
Dept: URGENT CARE | Facility: CLINIC | Age: 58
End: 2023-12-30
Payer: COMMERCIAL

## 2023-12-30 VITALS
TEMPERATURE: 98.4 F | SYSTOLIC BLOOD PRESSURE: 140 MMHG | OXYGEN SATURATION: 98 % | HEART RATE: 76 BPM | RESPIRATION RATE: 18 BRPM | DIASTOLIC BLOOD PRESSURE: 78 MMHG

## 2023-12-30 DIAGNOSIS — K04.7 DENTAL ABSCESS: Primary | ICD-10-CM

## 2023-12-30 PROCEDURE — 99213 OFFICE O/P EST LOW 20 MIN: CPT | Performed by: PHYSICIAN ASSISTANT

## 2023-12-30 RX ORDER — DIPHENHYDRAMINE HYDROCHLORIDE AND LIDOCAINE HYDROCHLORIDE AND ALUMINUM HYDROXIDE AND MAGNESIUM HYDRO
10 KIT EVERY 4 HOURS PRN
Qty: 120 ML | Refills: 0 | Status: SHIPPED | OUTPATIENT
Start: 2023-12-30

## 2023-12-30 RX ORDER — CLINDAMYCIN HYDROCHLORIDE 300 MG/1
300 CAPSULE ORAL 3 TIMES DAILY
Qty: 21 CAPSULE | Refills: 0 | Status: SHIPPED | OUTPATIENT
Start: 2023-12-30 | End: 2024-01-06

## 2023-12-30 RX ORDER — INSULIN GLARGINE-YFGN 100 [IU]/ML
INJECTION, SOLUTION SUBCUTANEOUS
COMMUNITY
Start: 2023-12-28

## 2023-12-30 NOTE — PATIENT INSTRUCTIONS
Dental Abscess   WHAT YOU NEED TO KNOW:   A dental abscess is a collection of pus in or around a tooth. A dental abscess is caused by bacteria. The bacteria can enter the tooth when the enamel (outer part of the tooth) is damaged by tooth decay. Bacteria can also enter the tooth through a chip in the tooth or a cut in the gum. Food particles that are stuck between the teeth for a long time may also lead to an abscess.        DISCHARGE INSTRUCTIONS:   Return to the emergency department if:   You have severe pain in your tooth or jaw.    You have trouble breathing because of pain or swelling.    Call your doctor if:   Your symptoms get worse, even after treatment.    Your mouth is bleeding.    You cannot eat or drink because of pain or swelling.    Your abscess returns.    You have an injury that causes a crack in your tooth.    You have questions or concerns about your condition or care.    Medicines:  You may  need any of the following:  Antibiotics  help treat a bacterial infection.     NSAIDs , such as ibuprofen, help decrease swelling, pain, and fever. This medicine is available with or without a doctor's order. NSAIDs can cause stomach bleeding or kidney problems in certain people. If you take blood thinner medicine, always ask your healthcare provider if NSAIDs are safe for you. Always read the medicine label and follow directions.    Acetaminophen  decreases pain and fever. It is available without a doctor's order. Ask how much to take and how often to take it. Follow directions. Read the labels of all other medicines you are using to see if they also contain acetaminophen, or ask your doctor or pharmacist. Acetaminophen can cause liver damage if not taken correctly.    Prescription pain medicine  may be given. Ask your healthcare provider how to take this medicine safely. Some prescription pain medicines contain acetaminophen. Do not take other medicines that contain acetaminophen without talking to your  healthcare provider. Too much acetaminophen may cause liver damage. Prescription pain medicine may cause constipation. Ask your healthcare provider how to prevent or treat constipation.     Take your medicine as directed.  Contact your healthcare provider if you think your medicine is not helping or if you have side effects. Tell your provider if you are allergic to any medicine. Keep a list of the medicines, vitamins, and herbs you take. Include the amounts, and when and why you take them. Bring the list or the pill bottles to follow-up visits. Carry your medicine list with you in case of an emergency.    Self-care:   Rinse your mouth every 2 hours with salt water.  This will help keep the area clean.     Gently brush your teeth twice a day with a soft tooth brush.  This will help keep the area clean.     Eat soft foods as directed.  Soft foods may cause less pain. Examples include applesauce, yogurt, and cooked pasta. Ask your healthcare provider how long to follow this instruction.     Apply a warm compress to your tooth or gum.  Use a cotton ball or gauze soaked in warm water. Remove the compress in 10 minutes or when it becomes cool. Repeat 3 times a day.    Prevent another abscess:   Brush your teeth at least 2 times a day  with fluoride toothpaste.    Use dental floss at least once a day  to clean between your teeth.    Rinse your mouth with water or mouthwash  after meals and snacks. Chew sugarless gum.    Avoid sugary and starchy food that can stick between your teeth.  Limit drinks high in sugar, such as soda or fruit juice.    See your dentist every 6 months  for dental cleanings and oral exams.    Follow up with your doctor or dentist in 24 hours, or as directed:  Your healthcare provider will need to check your teeth and gums. Write down your questions so you remember to ask them during your visits.   © Copyright [employer] 2023 Information is for End User's use only and may not be sold, redistributed or  otherwise used for commercial purposes.  The above information is an  only. It is not intended as medical advice for individual conditions or treatments. Talk to your doctor, nurse or pharmacist before following any medical regimen to see if it is safe and effective for you.

## 2023-12-30 NOTE — PROGRESS NOTES
[facility]        NAME: Ben Mas is a 58 y.o. male  : 1965    MRN: [id number]  DATE: 2023  TIME: 3:23 PM    Assessment and Plan   Dental abscess [K04.7]  1. Dental abscess  clindamycin (CLEOCIN) 300 MG capsule    diphenhydramine, lidocaine, Al/Mg hydroxide, simethicone (Magic Mouthwash) SUSP        Instructed to go to the ER with worsening symptoms and follow-up with a dentist.    Patient Instructions     Patient Instructions   Dental Abscess   WHAT YOU NEED TO KNOW:   A dental abscess is a collection of pus in or around a tooth. A dental abscess is caused by bacteria. The bacteria can enter the tooth when the enamel (outer part of the tooth) is damaged by tooth decay. Bacteria can also enter the tooth through a chip in the tooth or a cut in the gum. Food particles that are stuck between the teeth for a long time may also lead to an abscess.        DISCHARGE INSTRUCTIONS:   Return to the emergency department if:   You have severe pain in your tooth or jaw.    You have trouble breathing because of pain or swelling.    Call your doctor if:   Your symptoms get worse, even after treatment.    Your mouth is bleeding.    You cannot eat or drink because of pain or swelling.    Your abscess returns.    You have an injury that causes a crack in your tooth.    You have questions or concerns about your condition or care.    Medicines:  You may  need any of the following:  Antibiotics  help treat a bacterial infection.     NSAIDs , such as ibuprofen, help decrease swelling, pain, and fever. This medicine is available with or without a doctor's order. NSAIDs can cause stomach bleeding or kidney problems in certain people. If you take blood thinner medicine, always ask your healthcare provider if NSAIDs are safe for you. Always read the medicine label and follow directions.    Acetaminophen  decreases pain and fever. It is available without a doctor's order. Ask how much to take and how often to  take it. Follow directions. Read the labels of all other medicines you are using to see if they also contain acetaminophen, or ask your doctor or pharmacist. Acetaminophen can cause liver damage if not taken correctly.    Prescription pain medicine  may be given. Ask your healthcare provider how to take this medicine safely. Some prescription pain medicines contain acetaminophen. Do not take other medicines that contain acetaminophen without talking to your healthcare provider. Too much acetaminophen may cause liver damage. Prescription pain medicine may cause constipation. Ask your healthcare provider how to prevent or treat constipation.     Take your medicine as directed.  Contact your healthcare provider if you think your medicine is not helping or if you have side effects. Tell your provider if you are allergic to any medicine. Keep a list of the medicines, vitamins, and herbs you take. Include the amounts, and when and why you take them. Bring the list or the pill bottles to follow-up visits. Carry your medicine list with you in case of an emergency.    Self-care:   Rinse your mouth every 2 hours with salt water.  This will help keep the area clean.     Gently brush your teeth twice a day with a soft tooth brush.  This will help keep the area clean.     Eat soft foods as directed.  Soft foods may cause less pain. Examples include applesauce, yogurt, and cooked pasta. Ask your healthcare provider how long to follow this instruction.     Apply a warm compress to your tooth or gum.  Use a cotton ball or gauze soaked in warm water. Remove the compress in 10 minutes or when it becomes cool. Repeat 3 times a day.    Prevent another abscess:   Brush your teeth at least 2 times a day  with fluoride toothpaste.    Use dental floss at least once a day  to clean between your teeth.    Rinse your mouth with water or mouthwash  after meals and snacks. Chew sugarless gum.    Avoid sugary and starchy food that can stick  between your teeth.  Limit drinks high in sugar, such as soda or fruit juice.    See your dentist every 6 months  for dental cleanings and oral exams.    Follow up with your doctor or dentist in 24 hours, or as directed:  Your healthcare provider will need to check your teeth and gums. Write down your questions so you remember to ask them during your visits.   © Copyright [employer] 2023 Information is for End User's use only and may not be sold, redistributed or otherwise used for commercial purposes.  The above information is an  only. It is not intended as medical advice for individual conditions or treatments. Talk to your doctor, nurse or pharmacist before following any medical regimen to see if it is safe and effective for you.        Follow up with PCP in 3-5 days.  Proceed to  ER if symptoms worsen.    Chief Complaint     Chief Complaint   Patient presents with    Dental Problem     Pt with right side abscess tooth. Pt has pain and swelling that started yesterday          History of Present Illness       The patient is a 58-year-old male presenting today for a dental abscess on the right side lower jaw.  Reports having this in the past.  Did see a dentist but had an issue and was not able to go back.  Symptoms began yesterday.        Review of Systems   Review of Systems   Constitutional:  Negative for activity change, appetite change, chills, diaphoresis and fever.   HENT:  Positive for dental problem and facial swelling. Negative for congestion, ear pain, rhinorrhea and sore throat.    Eyes:  Negative for pain and visual disturbance.   Respiratory:  Negative for cough, chest tightness and shortness of breath.    Cardiovascular:  Negative for chest pain and palpitations.   Gastrointestinal:  Negative for abdominal pain, diarrhea, nausea and vomiting.   Genitourinary:  Negative for dysuria and hematuria.   Musculoskeletal:  Negative for arthralgias, back pain and myalgias.   Skin:  Negative for  color change, pallor and rash.   Neurological:  Negative for seizures, syncope and headaches.   All other systems reviewed and are negative.        Current Medications       Current Outpatient Medications:     atorvastatin (LIPITOR) 10 mg tablet, Take 1 tablet (10 mg total) by mouth daily with dinner, Disp: 30 tablet, Rfl: 0    Blood Glucose Monitoring Suppl (OneTouch Verio Reflect) w/Device KIT, May substitute brand based on insurance coverage. Check glucose TID., Disp: 1 kit, Rfl: 0    clindamycin (CLEOCIN) 300 MG capsule, Take 1 capsule (300 mg total) by mouth 3 (three) times a day for 7 days, Disp: 21 capsule, Rfl: 0    diphenhydramine, lidocaine, Al/Mg hydroxide, simethicone (Magic Mouthwash) SUSP, Swish and spit 10 mL every 4 (four) hours as needed for mouth pain or discomfort, Disp: 120 mL, Rfl: 0    glucose blood (OneTouch Verio) test strip, May substitute brand based on insurance coverage. Check glucose TID., Disp: 100 each, Rfl: 0    Insulin Glargine Solostar (Basaglar KwikPen) 100 UNIT/ML SOPN, Inject 0.16 mL (16 Units total) under the skin daily at bedtime, Disp: 6 mL, Rfl: 0    Insulin Pen Needle 32G X 4 MM MISC, Use 4 (four) times a day, Disp: 120 each, Rfl: 0    metFORMIN (GLUCOPHAGE) 1000 MG tablet, Take 1,000 mg by mouth 2 (two) times a day, Disp: , Rfl:     OneTouch Delica Lancets 33G MISC, May substitute brand based on insurance coverage. Check glucose TID., Disp: 100 each, Rfl: 0    Semglee, yfgn, 100 UNIT/ML SOPN, , Disp: , Rfl:     Vitamin D, Ergocalciferol, 44202 units CAPS, Take 50,000 Units by mouth once a week, Disp: 4 capsule, Rfl: 0    Alcohol Swabs 70 % PADS, May substitute brand based on insurance coverage. Check glucose TID., Disp: 100 each, Rfl: 0    insuln lispro (HumaLOG KwikPen) 200 units/mL CONCENTRATED U-200 injection pen, Inject 7 Units under the skin 3 (three) times a day with meals (Patient not taking: Reported on 12/30/2023), Disp: 3 mL, Rfl: 0    Current Allergies      Allergies as of 12/30/2023 - Reviewed 12/30/2023   Allergen Reaction Noted    Penicillins Edema 09/01/2023            The following portions of the patient's history were reviewed and updated as appropriate: allergies, current medications, past family history, past medical history, past social history, past surgical history and problem list.     Past Medical History:   Diagnosis Date    Diabetes mellitus (HCC)     High cholesterol     Hypertension        History reviewed. No pertinent surgical history.    Family History   Problem Relation Age of Onset    Diabetes Father     Diabetes Maternal Uncle          Medications have been verified.        Objective   /78   Pulse 76   Temp 98.4 °F (36.9 °C) (Temporal)   Resp 18   SpO2 98%        Physical Exam     Physical Exam  Vitals and nursing note reviewed.   Constitutional:       General: He is not in acute distress.     Appearance: Normal appearance. He is normal weight. He is not ill-appearing, toxic-appearing or diaphoretic.   HENT:      Head: Normocephalic and atraumatic.      Right Ear: Tympanic membrane, ear canal and external ear normal. There is no impacted cerumen.      Left Ear: Tympanic membrane, ear canal and external ear normal. There is no impacted cerumen.      Nose: Nose normal. No congestion or rhinorrhea.      Mouth/Throat:      Mouth: Mucous membranes are moist.      Pharynx: Oropharynx is clear. No oropharyngeal exudate or posterior oropharyngeal erythema.      Comments: Poor dentition.  Swelling on the right side of patient's face right lower jaw that is tender to palpation.  Erythema around the present teeth.  Missing back teeth.  Cardiovascular:      Rate and Rhythm: Normal rate and regular rhythm.      Heart sounds: Normal heart sounds. No murmur heard.     No friction rub. No gallop.   Pulmonary:      Effort: Pulmonary effort is normal. No respiratory distress.      Breath sounds: Normal breath sounds. No stridor. No wheezing, rhonchi  or rales.   Chest:      Chest wall: No tenderness.   Abdominal:      General: Abdomen is flat. Bowel sounds are normal. There is no distension.      Palpations: Abdomen is soft.      Tenderness: There is no abdominal tenderness. There is no guarding.   Musculoskeletal:      Cervical back: Normal range of motion.   Lymphadenopathy:      Cervical: No cervical adenopathy.   Skin:     General: Skin is warm and dry.      Capillary Refill: Capillary refill takes less than 2 seconds.   Neurological:      Mental Status: He is alert.